# Patient Record
Sex: FEMALE | Race: WHITE | NOT HISPANIC OR LATINO | Employment: UNEMPLOYED | URBAN - METROPOLITAN AREA
[De-identification: names, ages, dates, MRNs, and addresses within clinical notes are randomized per-mention and may not be internally consistent; named-entity substitution may affect disease eponyms.]

---

## 2024-01-31 PROBLEM — J20.9 ACUTE BRONCHITIS: Status: RESOLVED | Noted: 2024-01-31 | Resolved: 2024-01-31

## 2024-01-31 PROBLEM — L30.9 DERMATITIS: Status: RESOLVED | Noted: 2024-01-31 | Resolved: 2024-01-31

## 2024-01-31 PROBLEM — L98.9 SKIN LESION OF FACE: Status: RESOLVED | Noted: 2024-01-31 | Resolved: 2024-01-31

## 2024-01-31 PROBLEM — M75.90 LESION OF SHOULDER: Status: RESOLVED | Noted: 2024-01-31 | Resolved: 2024-01-31

## 2024-01-31 PROBLEM — R10.13 EPIGASTRIC PAIN: Status: RESOLVED | Noted: 2024-01-31 | Resolved: 2024-01-31

## 2024-01-31 PROBLEM — O24.419 GDM (GESTATIONAL DIABETES MELLITUS) (HHS-HCC): Status: RESOLVED | Noted: 2024-01-31 | Resolved: 2024-01-31

## 2024-01-31 RX ORDER — CHOLECALCIFEROL (VITAMIN D3) 25 MCG
TABLET ORAL
COMMUNITY
Start: 2021-06-08

## 2024-02-01 ENCOUNTER — OFFICE VISIT (OUTPATIENT)
Dept: OBSTETRICS AND GYNECOLOGY | Facility: CLINIC | Age: 34
End: 2024-02-01
Payer: COMMERCIAL

## 2024-02-01 VITALS
SYSTOLIC BLOOD PRESSURE: 112 MMHG | WEIGHT: 147 LBS | HEIGHT: 65 IN | DIASTOLIC BLOOD PRESSURE: 68 MMHG | BODY MASS INDEX: 24.49 KG/M2

## 2024-02-01 DIAGNOSIS — Z01.419 WELL WOMAN EXAM: ICD-10-CM

## 2024-02-01 PROCEDURE — 99395 PREV VISIT EST AGE 18-39: CPT | Performed by: OBSTETRICS & GYNECOLOGY

## 2024-02-01 PROCEDURE — 87624 HPV HI-RISK TYP POOLED RSLT: CPT

## 2024-02-01 PROCEDURE — 88175 CYTOPATH C/V AUTO FLUID REDO: CPT

## 2024-02-01 NOTE — PROGRESS NOTES
"Almaz Bates is a 33 y.o. female who is here for a routine exam. PCP = Dalila Magana MD  Patient will do IVF in the next month or 2    Menses : monthly  Contraception : other  HPV vaccine : No  Last pap : 2022 normal  Last HPV : 2020 negative  History of abnormal pap : No  Last mammogram : never  History of abnormal mammogram : No  Colon cancer screen : never    ROS  systems reviewed, anything negative noted in HPI    bladder: no dysuria, gross hematuria, urinary frequency, urgency or incontinence  breast: no lumps, nipple d/c, overlying skin changes, redness, or skin retraction    [unfilled]    Past med hx and past surg hx reviewed and notable for: none    Objective   /68   Ht 1.651 m (5' 5\")   Wt 66.7 kg (147 lb)   LMP 01/10/2024   BMI 24.46 kg/m²      General:   Alert and oriented, in no acute distress   Neck: Supple. No visible thyromegaly.    Breast/Axilla: Normal to palpation bilaterally without masses, skin changes, lymphadenopathy, or nipple discharge.    Abdomen: Soft, non-tender, without masses or organomegaly   Vulva: Normal architecture without erythema, masses, or lesions.    Vagina: Normal mucosa without lesions, masses, or atrophy. No abnormal vaginal discharge.    Cervix: Normal without masses, lesions, or signs of cervicitis.    Uterus: Normal mobile, non-enlarged uterus    Adnexa: Normal without masses or lesions   Pelvic Floor No POP noted.    Psych Normal affect. Normal mood.      Thank you for coming to your annual exam. Your findings during the exam were normal. Specific topics addressed during this exam included: healthy lifestyle, well woman screening guidelines,     Actions performed during this visit include:  - Clinical breast exam  - Clinical pelvic exam  - pap/hpv  No orders of the defined types were placed in this encounter.          Please return for your next visit in 1 year.  "

## 2024-02-22 ENCOUNTER — TELEMEDICINE (OUTPATIENT)
Dept: ENDOCRINOLOGY | Facility: CLINIC | Age: 34
End: 2024-02-22
Payer: COMMERCIAL

## 2024-02-22 DIAGNOSIS — Z13.29 SCREENING FOR THYROID DISORDER: Primary | ICD-10-CM

## 2024-02-22 DIAGNOSIS — Z31.41 FERTILITY TESTING: ICD-10-CM

## 2024-02-22 DIAGNOSIS — Z13.1 SCREENING FOR DIABETES MELLITUS: ICD-10-CM

## 2024-02-22 DIAGNOSIS — Z01.812 ENCOUNTER FOR PREPROCEDURAL LABORATORY EXAMINATION: ICD-10-CM

## 2024-02-22 DIAGNOSIS — Z11.3 SCREENING FOR STDS (SEXUALLY TRANSMITTED DISEASES): ICD-10-CM

## 2024-02-22 DIAGNOSIS — Z01.83 ENCOUNTER FOR RH BLOOD TYPING: ICD-10-CM

## 2024-02-22 PROCEDURE — 1036F TOBACCO NON-USER: CPT | Performed by: NURSE PRACTITIONER

## 2024-02-22 PROCEDURE — 99214 OFFICE O/P EST MOD 30 MIN: CPT | Performed by: NURSE PRACTITIONER

## 2024-02-22 NOTE — PROGRESS NOTES
"  Virtual Visit    Follow Up Visit HPI    Patient is a 33 y.o.  female with Unexplained Infertility presenting today for follow up visit. She is wanting to do an FET.     4 blast remaining    OBHX:     22- , FT, Girl, \"Mahnaz\", gestational diabetes, induced a few days early due to low amniotic fluid.  She is done breastfeeding.    Testing to date:   Result Date Done   Type and Screen: B 2022   Rh: POS 2022   Antibody: No results found for requested labs within last 1825 days.  No results found for requested labs within last 1825 days.   Rubella: POSITIVE (Ref range: ) 2022   Varicella: POSITIVE (Ref range: NEGATIVE) 10/12/2020   TSH: 1.51 (Ref range: 0.44 - 3.98 mIU/L) 10/12/2020   AMH: 2.14 (Ref range: ng/mL) 10/12/2020   PRL: No results found for requested labs within last 365 days. No results found for requested labs within last 365 days.   Testosterone: No results found for requested labs within last 365 days. No results found for requested labs within last 365 days.   DHEAS: No results found for requested labs within last 365 days. No results found for requested labs within last 365 days.   FSH: No results found for requested labs within last 365 days. No results found for requested labs within last 365 days.   17 OHP: No results found for requested labs within last 365 days. No results found for requested labs within last 365 days.   Hepatitis B surface antigen: No results found for requested labs within last 365 days. No results found for requested labs within last 365 days.   Hepatitis C antibody: No results found for requested labs within last 365 days. No results found for requested labs within last 365 days.   HIV ½ Antigen Antibody screen with reflex: No results found for requested labs within last 365 days. No results found for requested labs within last 365 days.   Syphilis screening with reflex: No results found for requested labs within last 365 days. No results found for " requested labs within last 365 days.   Other:     Hysterosalpingogram: Uterus: Normal contour without filling defects  Fallopian Tubes:  Right Fallopian tube: fill and spill, normal architecture, patent  Left Fallopian tube: fill to distal end, normal architecture, unable to confirm intraperitoneal spill, distal blockage vs preferential spill to left   Saline Infused Sonography: n/a  GYN Pelvic Ultrasound: n/a    Partner SA: Normal    Treatment to date: previously did IVF. Has 4 frozen blast.     Past Medical History:   Diagnosis Date    15 weeks gestation of pregnancy 01/04/2022    15 weeks gestation of pregnancy    16 weeks gestation of pregnancy     16 weeks gestation of pregnancy    32 weeks gestation of pregnancy 05/02/2022    32 weeks gestation of pregnancy    Abnormal glucose complicating pregnancy 04/18/2022    Abnormal glucose affecting pregnancy    Acute bronchitis 01/31/2024    Dermatitis 01/31/2024    Encounter for fertility testing 06/08/2021    Fertility testing    Encounter for immunization 11/16/2021    Encounter for administration of vaccine    Encounter for pregnancy test, result positive     Positive blood pregnancy test    Encounter for pregnancy test, result unknown 10/12/2021    Pregnancy examination or test, pregnancy unconfirmed    Encounter for screening for infections with a predominantly sexual mode of transmission 03/14/2021    Routine screening for STI (sexually transmitted infection)    Encounter for supervision of normal first pregnancy, first trimester 12/07/2021    Encounter for prenatal care in first trimester of first pregnancy    Encounter for supervision of normal first pregnancy, second trimester 03/01/2022    Encounter for prenatal care in second trimester of first pregnancy    Encounter for supervision of normal first pregnancy, third trimester 06/13/2022    Encounter for prenatal care in third trimester of first pregnancy    Encounter of female for testing for genetic disease  carrier status for procreative management 2021    Encounter of female for testing for genetic disease carrier status for procreative management    Epigastric pain 2024    Female infertility, unspecified 2021    Female fertility problems    GDM (gestational diabetes mellitus) 2024    Gestational diabetes mellitus in pregnancy, diet controlled 2022    Diet controlled gestational diabetes mellitus (GDM) in third trimester    Lesion of shoulder 2024    Other acute postprocedural pain 2021    Postoperative pain    Skin lesion of face 2024    Supervision of pregnancy resulting from assisted reproductive technology, unspecified trimester 2022    Pregnancy resulting from in-vitro fertilization     No past surgical history on file.  Current Outpatient Medications on File Prior to Visit   Medication Sig Dispense Refill    cholecalciferol (Vitamin D-3) 25 MCG (1000 UT) tablet Take by mouth.      prenatal no115/iron/folic acid (PRENATAL 19 ORAL) Take by mouth.       No current facility-administered medications on file prior to visit.       BMI:   BMI Readings from Last 1 Encounters:   24 24.46 kg/m²     VITALS:  LMP 01/10/2024   LMP: Patient's last menstrual period was 01/10/2024.    ASSESSMENT   33 y.o.  female with wanting to do an FET.     COUNSELING  FET NP Teaching Performed  Reviewed plan for upcoming FET.   Reviewed lining check. Reviewed estrogen priming and progesterone.   Reviewed need for ultrasound monitoring. Reviewed cancelled cycles, sometimes multiple to achieve optimal lining.   Reviewed possibility that embryos might not survive thaw and need for signed thaw plan.   All questions answered.     [ X] Hysteroscopy for cavity evaluation  We have agree to transfer: 1 embryos    Vanessa Rivera CNP          Routine Testing  Fertility Center  STDs Within 1 year   Genetic carrier Waiver/Completed   T&S Within 1 year   AMH Within 1 year   TSH Within 1 year    Rubella/Varicella Within 5 years     BMI Testing  Fertility Center  CBC Within 1 year   CMP Within 1 year   HgbA1c Within 1 year   Mag, Phos, Vit D <18 Within 1 year   MFM > 40  REQ   Wt loss consult > 40 OPT     PLAN  Orders Placed This Encounter   Procedures    Hysteroscopy diagnostic    TSH with reflex to Free T4 if abnormal    Type And Screen    Hepatitis B surface antigen    Hepatitis C Antibody    HIV 1/2 Antigen/Antibody Screen with Reflex to Confirmation    Syphilis Screen with Reflex    C. Trachomatis / N. Gonorrhoeae, Amplified Detection    Hemoglobin A1C    POCT pregnancy, urine manually resulted     Programmed FET:  Estrace 6mg po daily  POI 75mg IM dialy    FOLLOW UP   Consults:  N/A  Engaged MD  Take prenatal vitamins, vitamin D 2000 IUs daily  Discussed that treatment cannot proceed until checklist items are complete.   Additional testing for BMI < 18 or > 40: No.  Chart to primary nurse for care coordination and patient check list/education.      Vanessa Rivera  02/22/2024  1:36 PM

## 2024-02-23 ENCOUNTER — LAB (OUTPATIENT)
Dept: LAB | Facility: LAB | Age: 34
End: 2024-02-23
Payer: COMMERCIAL

## 2024-02-23 DIAGNOSIS — Z13.29 SCREENING FOR THYROID DISORDER: ICD-10-CM

## 2024-02-23 DIAGNOSIS — Z13.1 SCREENING FOR DIABETES MELLITUS: ICD-10-CM

## 2024-02-23 DIAGNOSIS — Z11.3 SCREENING FOR STDS (SEXUALLY TRANSMITTED DISEASES): ICD-10-CM

## 2024-02-23 DIAGNOSIS — Z01.83 ENCOUNTER FOR RH BLOOD TYPING: ICD-10-CM

## 2024-02-23 LAB
ABO GROUP (TYPE) IN BLOOD: NORMAL
ANTIBODY SCREEN: NORMAL
EST. AVERAGE GLUCOSE BLD GHB EST-MCNC: 97 MG/DL
HBA1C MFR BLD: 5 %
HBV SURFACE AG SERPL QL IA: NONREACTIVE
HCV AB SER QL: NONREACTIVE
HIV 1+2 AB+HIV1 P24 AG SERPL QL IA: NONREACTIVE
RH FACTOR (ANTIGEN D): NORMAL
T4 FREE SERPL-MCNC: 1.08 NG/DL (ref 0.78–1.48)
TREPONEMA PALLIDUM IGG+IGM AB [PRESENCE] IN SERUM OR PLASMA BY IMMUNOASSAY: NONREACTIVE
TSH SERPL-ACNC: 4.22 MIU/L (ref 0.44–3.98)

## 2024-02-23 PROCEDURE — 86901 BLOOD TYPING SEROLOGIC RH(D): CPT

## 2024-02-23 PROCEDURE — 84439 ASSAY OF FREE THYROXINE: CPT

## 2024-02-23 PROCEDURE — 86803 HEPATITIS C AB TEST: CPT

## 2024-02-23 PROCEDURE — 86900 BLOOD TYPING SEROLOGIC ABO: CPT

## 2024-02-23 PROCEDURE — 86780 TREPONEMA PALLIDUM: CPT

## 2024-02-23 PROCEDURE — 84443 ASSAY THYROID STIM HORMONE: CPT

## 2024-02-23 PROCEDURE — 83036 HEMOGLOBIN GLYCOSYLATED A1C: CPT

## 2024-02-23 PROCEDURE — 87340 HEPATITIS B SURFACE AG IA: CPT

## 2024-02-23 PROCEDURE — 86850 RBC ANTIBODY SCREEN: CPT

## 2024-02-23 PROCEDURE — 87800 DETECT AGNT MULT DNA DIREC: CPT

## 2024-02-23 PROCEDURE — 87389 HIV-1 AG W/HIV-1&-2 AB AG IA: CPT

## 2024-02-24 LAB
C TRACH RRNA SPEC QL NAA+PROBE: NEGATIVE
N GONORRHOEA DNA SPEC QL PROBE+SIG AMP: NEGATIVE

## 2024-02-26 ENCOUNTER — DOCUMENTATION (OUTPATIENT)
Dept: ENDOCRINOLOGY | Facility: CLINIC | Age: 34
End: 2024-02-26
Payer: COMMERCIAL

## 2024-02-26 DIAGNOSIS — E03.8 OTHER SPECIFIED HYPOTHYROIDISM: Primary | ICD-10-CM

## 2024-02-26 RX ORDER — LEVOTHYROXINE SODIUM 50 UG/1
50 TABLET ORAL DAILY
Qty: 30 TABLET | Refills: 1 | Status: SHIPPED | OUTPATIENT
Start: 2024-02-26 | End: 2024-04-25 | Stop reason: SDUPTHER

## 2024-02-26 NOTE — PROGRESS NOTES
Called patient to discuss. Reviewed diagnosis of hypothyroidism.  Plan to start synthroid 50mcg and recheck in 4 weeks with thyroid antibodies. Orders  placed.

## 2024-03-12 ENCOUNTER — PREP FOR PROCEDURE (OUTPATIENT)
Dept: ENDOCRINOLOGY | Facility: CLINIC | Age: 34
End: 2024-03-12

## 2024-03-12 ENCOUNTER — HOSPITAL ENCOUNTER (OUTPATIENT)
Dept: ENDOCRINOLOGY | Facility: CLINIC | Age: 34
Discharge: HOME | End: 2024-03-12
Payer: COMMERCIAL

## 2024-03-12 VITALS
OXYGEN SATURATION: 100 % | TEMPERATURE: 98.6 F | HEART RATE: 82 BPM | SYSTOLIC BLOOD PRESSURE: 123 MMHG | WEIGHT: 143.74 LBS | BODY MASS INDEX: 27.14 KG/M2 | DIASTOLIC BLOOD PRESSURE: 84 MMHG | RESPIRATION RATE: 18 BRPM | HEIGHT: 61 IN

## 2024-03-12 DIAGNOSIS — Z31.41 FERTILITY TESTING: ICD-10-CM

## 2024-03-12 LAB — PREGNANCY TEST URINE, POC: NEGATIVE

## 2024-03-12 PROCEDURE — 81025 URINE PREGNANCY TEST: CPT | Performed by: STUDENT IN AN ORGANIZED HEALTH CARE EDUCATION/TRAINING PROGRAM

## 2024-03-12 PROCEDURE — 58555 HYSTEROSCOPY DX SEP PROC: CPT | Performed by: OBSTETRICS & GYNECOLOGY

## 2024-03-12 RX ORDER — ACETAMINOPHEN 325 MG/1
650 TABLET ORAL ONCE AS NEEDED
Status: DISCONTINUED | OUTPATIENT
Start: 2024-03-12 | End: 2024-03-13 | Stop reason: HOSPADM

## 2024-03-12 RX ORDER — KETOROLAC TROMETHAMINE 30 MG/ML
30 INJECTION, SOLUTION INTRAMUSCULAR; INTRAVENOUS ONCE AS NEEDED
Status: CANCELLED | OUTPATIENT
Start: 2024-03-12 | End: 2024-03-17

## 2024-03-12 RX ORDER — KETOROLAC TROMETHAMINE 30 MG/ML
30 INJECTION, SOLUTION INTRAMUSCULAR; INTRAVENOUS ONCE AS NEEDED
Status: DISCONTINUED | OUTPATIENT
Start: 2024-03-12 | End: 2024-03-13 | Stop reason: HOSPADM

## 2024-03-12 RX ORDER — ACETAMINOPHEN 325 MG/1
650 TABLET ORAL ONCE AS NEEDED
Status: CANCELLED | OUTPATIENT
Start: 2024-03-12

## 2024-03-12 NOTE — PROGRESS NOTES
Patient ID: Almaz Bates is a 33 y.o. female.    Hysteroscopy diagnostic    Date/Time: 3/12/2024 2:06 PM    Performed by: Bell Ley MD  Authorized by: NEETU Patricio-CNP    Consent:     Consent obtained:  Written    Consent given by:  Patient    Risks, benefits, and alternatives were discussed: yes      Risks discussed:  Bleeding, infection and pain  Universal protocol:     Procedure explained and questions answered to patient or proxy's satisfaction: yes      Relevant documents present and verified: yes      Test results available: yes      Imaging studies available: yes      Required blood products, implants, devices, and special equipment available: yes      Immediately prior to procedure, a time out was called: yes      Patient identity confirmed:  Verbally with patient, arm band and hospital-assigned identification number  Pre-procedure details:     Skin preparation:  Povidone-iodine  Procedure specific details:      Procedure: Diagnostic Hysteroscopy   Preop diagnosis: IVF  Post op diagnosis: Same   Assistant: Carlee Ley    Anesthesia: None   IV: None   EBL: 3 cc  Specimens: None   Complications: None   Risks benefits and alternatives of the procedure explained to the patient and informed consent was obtained. Urine pregnancy test was performed and was negative. Time out was performed. The patient was placed in the dorsal lithotomy position and a sterile speculum was placed in the vagina. The cervix was sterilized with Betadine x3. Paracervical block with lidocaine 1% was administered.   Tenaculum: Yes  Dilation: No  The hysteroscope was placed in the cervix and advanced into the uterine cavity. Normal saline was used for distension media. Images were obtained and findings noted as below.   All instruments were then removed. Good hemostasis was noted. Patient tolerated the procedure well returned to the recovery area in stable condition.   Findings:   Cavity: Smooth cavity no lesions  noted  Ostia: Bilateral tubal ostia visualized  Additional Notes:  Bell Ley MD    Attending Attestation: I was physically present for key and critical portions performed by the fellow. I reviewed the fellow's documentation and discussed the patient with the fellow. I agree with the fellow's medical decision making as documented in the fellow's note.             Post-procedure details:     Procedure completion:  Tolerated well, no immediate complications

## 2024-03-27 ENCOUNTER — LAB (OUTPATIENT)
Dept: LAB | Facility: LAB | Age: 34
End: 2024-03-27
Payer: COMMERCIAL

## 2024-03-27 DIAGNOSIS — E03.8 OTHER SPECIFIED HYPOTHYROIDISM: ICD-10-CM

## 2024-03-27 LAB
THYROPEROXIDASE AB SERPL-ACNC: 42 IU/ML
TSH SERPL-ACNC: 1.5 MIU/L (ref 0.44–3.98)

## 2024-03-27 PROCEDURE — 86376 MICROSOMAL ANTIBODY EACH: CPT

## 2024-03-27 PROCEDURE — 84443 ASSAY THYROID STIM HORMONE: CPT

## 2024-03-27 PROCEDURE — 36415 COLL VENOUS BLD VENIPUNCTURE: CPT

## 2024-03-28 ENCOUNTER — TELEPHONE (OUTPATIENT)
Dept: ENDOCRINOLOGY | Facility: CLINIC | Age: 34
End: 2024-03-28
Payer: COMMERCIAL

## 2024-03-28 DIAGNOSIS — N97.9 FEMALE INFERTILITY: ICD-10-CM

## 2024-03-28 DIAGNOSIS — Z31.83 ENCOUNTER FOR ASSISTED REPRODUCTIVE FERTILITY CYCLE: ICD-10-CM

## 2024-03-28 RX ORDER — LIDOCAINE AND PRILOCAINE 25; 25 MG/G; MG/G
CREAM TOPICAL DAILY
Qty: 30 G | Refills: 2 | Status: SHIPPED | OUTPATIENT
Start: 2024-03-28

## 2024-03-28 RX ORDER — PROPYLENE GLYCOL/PEG 400 0.3 %-0.4%
DROPS OPHTHALMIC (EYE)
Qty: 30 EACH | Refills: 3 | Status: SHIPPED | OUTPATIENT
Start: 2024-03-28 | End: 2024-06-26

## 2024-03-28 RX ORDER — PROGESTERONE 50 MG/ML
75 INJECTION, SOLUTION INTRAMUSCULAR DAILY
Qty: 50 ML | Refills: 3 | Status: SHIPPED | OUTPATIENT
Start: 2024-03-28

## 2024-03-28 RX ORDER — ESTRADIOL 2 MG/1
6 TABLET ORAL DAILY
Qty: 90 TABLET | Refills: 2 | Status: SHIPPED | OUTPATIENT
Start: 2024-03-28 | End: 2024-04-25 | Stop reason: SDUPTHER

## 2024-03-28 NOTE — TELEPHONE ENCOUNTER
RN returned patient's phone call. Patient informed RN her Engaged MD papers all say void. RN sent patient new forms to be completed as soon as possible. Patient expects to start her cycle over the weekend, RN pended orders to be signed off for her FET meds. Patient verbalized understanding of her current plan and will call with the start of her menses.   Ivana Grissom RN 03/28/24 2:13 PM

## 2024-03-28 NOTE — PROGRESS NOTES
Boarding Pass Interval FET Checklist    Age: 33 y.o.    Provider: Vanessa Rivera CNP  Primary RN: Ivana Grissom  Reasons for Treatment: Unexplained Infertility  Last BMI  24 : 27.16 kg/m²       Past Medical History:   Diagnosis Date    15 weeks gestation of pregnancy 2022    15 weeks gestation of pregnancy    16 weeks gestation of pregnancy     16 weeks gestation of pregnancy    32 weeks gestation of pregnancy 2022    32 weeks gestation of pregnancy    Abnormal glucose complicating pregnancy 2022    Abnormal glucose affecting pregnancy    Acute bronchitis 2024    Dermatitis 2024    Encounter for fertility testing 2021    Fertility testing    Encounter for immunization 2021    Encounter for administration of vaccine    Encounter for pregnancy test, result positive     Positive blood pregnancy test    Encounter for pregnancy test, result unknown 10/12/2021    Pregnancy examination or test, pregnancy unconfirmed    Encounter for screening for infections with a predominantly sexual mode of transmission 2021    Routine screening for STI (sexually transmitted infection)    Encounter for supervision of normal first pregnancy, first trimester 2021    Encounter for prenatal care in first trimester of first pregnancy    Encounter for supervision of normal first pregnancy, second trimester 2022    Encounter for prenatal care in second trimester of first pregnancy    Encounter for supervision of normal first pregnancy, third trimester 2022    Encounter for prenatal care in third trimester of first pregnancy    Encounter of female for testing for genetic disease carrier status for procreative management 2021    Encounter of female for testing for genetic disease carrier status for procreative management    Epigastric pain 2024    Female infertility, unspecified 2021    Female fertility problems    GDM (gestational diabetes mellitus)  01/31/2024    Gestational diabetes mellitus in pregnancy, diet controlled 05/19/2022    Diet controlled gestational diabetes mellitus (GDM) in third trimester    Lesion of shoulder 01/31/2024    Other acute postprocedural pain 08/25/2021    Postoperative pain    Skin lesion of face 01/31/2024    Supervision of pregnancy resulting from assisted reproductive technology, unspecified trimester 05/02/2022    Pregnancy resulting from in-vitro fertilization       Date Done Consultation Results/Comments   2/22/2024 Medication Protocol Fertility Plan Update: Programmed FET   Estrace 6mg PO daily, SHANNON 75mg IM    4/2/2024 FET Treatment Plan Packet- ID REQ (Every cycle) Received and in chart: Yes (Ivana Grissom, SHIRA)   3/28/2024 IVF Information and Authorization - [employer]/offsite Storage (ID REQ) (Yearly) Received and in chart: Yes (Ivana Grissom RN)   4/2/2024 [employer] Embryo- Couple or Single Packet (Yearly) Received and in chart: Yes (Ivana Grissom RN)   N/A  Waiver (In) Form Patient's embryos on site    N/A Embryo Ship In Patient's embryos on site   3/28/2024 Procedure Order Placed [x]   N/A MFM Consult Okay to proceed? N/A   N/A Psych Consult Okay to proceed? N/A   N/A Genetics Consult Okay to proceed? N/A   Date Done Female Labs Results/Comments   2/23/2024 T&S (Q 1 Year) Results for orders placed or performed in visit on 02/23/24 (from the past 8760 hour(s))   Type And Screen   Result Value Ref Range    ABO TYPE B     Rh TYPE POS     ANTIBODY SCREEN NEG       2/23/2024 Hep B sAg Nonreactive   2/23/2024 Hep C AB Nonreactive   2/23/2024 HIV Nonreactive   2/23/2024 Syphilis Nonreactive   2/23/2024 GC/CT GC: Negative  CT: Negative   6/13/2022 Rubella (Q 5 Years) POSITIVE   10/12/2020 Varicella (Q 5 Years) POSITIVE   3/27/2024 TSH 1.50 (Ref range: 0.44 - 3.98 mIU/L)   2/23/2024 HgbA1C 5.0 (Ref range: see below %)   3/12/2024 Uterine Cavity Eval HSG: N/A    SIS: N/A    Hyster: (3/12/2024) Procedure: Diagnostic  Hysteroscopy   Preop diagnosis: IVF  Post op diagnosis: Same   Assistant: aCrlee Ley    Anesthesia: None   IV: None   EBL: 3 cc  Specimens: None   Complications: None   Risks benefits and alternatives of the procedure explained to the patient and informed consent was obtained. Urine pregnancy test was performed and was negative. Time out was performed. The patient was placed in the dorsal lithotomy position and a sterile speculum was placed in the vagina. The cervix was sterilized with Betadine x3. Paracervical block with lidocaine 1% was administered.   Tenaculum: Yes  Dilation: No  The hysteroscope was placed in the cervix and advanced into the uterine cavity. Normal saline was used for distension media. Images were obtained and findings noted as below.   All instruments were then removed. Good hemostasis was noted. Patient tolerated the procedure well returned to the recovery area in stable condition.   Findings:   Cavity: Smooth cavity no lesions noted  Ostia: Bilateral tubal ostia visualized  Additional Notes:  Bell Ley MD    Attending Attestation: I was physically present for key and critical portions performed by the fellow. I reviewed the fellow's documentation and discussed the patient with the fellow. I agree with the fellow's medical decision making as documented in the fellow's note.              2/1/2024 Pap Smear NILM, HPV: Negative   N/A Mammogram ( > 40) N/A             Date Done Miscellaneous Results/Comments   N/A BMI Checklist  BMI > 40 or < 18 Added to chart: N/A   N/A >= 45 Checklist  Added to chart: N/A   **Does not need to be completed prior to placing on IVF calendar**  RN confirmed patient's embryos are on site- 3/28/2024 RYAN MELÉNDEZ Completion:  Ectopic Risk: No  Medically Complex: No    Boarding Pass reviewed with: Ivana Grissom RN  Protocol:Programmed FET   Estrace 6mg PO daily, SHANNON 75mg IM   Embryo Selection: 4 available embryos, will transfer 1 embryo with the best grading  FET  Plan complete.  Testing and consents up to date.   Procedure order placed.    Torri Borrero MD PGY 5  Reproductive Endocrinology and Infertility Fellow

## 2024-03-29 ENCOUNTER — SPECIALTY PHARMACY (OUTPATIENT)
Dept: PHARMACY | Facility: CLINIC | Age: 34
End: 2024-03-29

## 2024-03-29 PROCEDURE — RXMED WILLOW AMBULATORY MEDICATION CHARGE

## 2024-04-01 ENCOUNTER — TELEPHONE (OUTPATIENT)
Dept: ENDOCRINOLOGY | Facility: CLINIC | Age: 34
End: 2024-04-01

## 2024-04-01 DIAGNOSIS — N97.9 FEMALE INFERTILITY: ICD-10-CM

## 2024-04-01 NOTE — TELEPHONE ENCOUNTER
RN returned patient's phone call and discussed tentative FET plan. Patient will start estrace today. Patient will call to schedule her lining check for Monday 4/15. Patient to start SHANNON on Friday 4/19 with a FET on Wednesday 4/24 with Dr. Michaud. RN will take patient to the noon meeting tomorrow to present plan. Patient will contact [facility] for progesterone in oil and supplies. Patient verbalized understanding of her plan.   Ivana Grissom RN 04/01/24 12:22 PM

## 2024-04-02 ENCOUNTER — TELEPHONE (OUTPATIENT)
Dept: ENDOCRINOLOGY | Facility: CLINIC | Age: 34
End: 2024-04-02

## 2024-04-02 NOTE — TELEPHONE ENCOUNTER
Rn returned patient's phone call. Patient and spouse are currently completing their Engaged MD forms. Patient informed RN their insurance covers embryo storage and have questions about that with billing through [employer], RN gave patient [employer]'s phone number so patient will receive correct responses to her questions.   Ivana Grissom RN 04/02/24 2:28 PM

## 2024-04-02 NOTE — PROGRESS NOTES
Patient called with menses for FET setup.   LMP: 4/1/2024  Treatment Plan: Programmed FET  Tentative lining check: Monday 4/15/2024  Tentative progesterone start: Friday 4/19/2024  Tentative transfer date: Wednesday 4/24 with Dr. Michaud  Number of days of progesterone for transfer: 6 days  Treatment plan completed on: 4/2/2024  Embryo # confirmed: 4  Embryo # to transfer: 1  Ivana Grissom  04/02/2024  8:05 AM    D/w Dr. Michaud  Reviewed and approved by ROHINI DIETZ on 4/2/24 at 1:26 PM.     RN communicated patient's plan approval through my chart. RN also notified patient that Engaged MD forms need to be completed.   Ivana Grissom RN 04/02/24 1:31 PM

## 2024-04-11 ENCOUNTER — SPECIALTY PHARMACY (OUTPATIENT)
Dept: PHARMACY | Facility: CLINIC | Age: 34
End: 2024-04-11

## 2024-04-11 ENCOUNTER — PHARMACY VISIT (OUTPATIENT)
Dept: PHARMACY | Facility: CLINIC | Age: 34
End: 2024-04-11
Payer: COMMERCIAL

## 2024-04-11 PROCEDURE — RXMED WILLOW AMBULATORY MEDICATION CHARGE

## 2024-04-15 ENCOUNTER — ANCILLARY PROCEDURE (OUTPATIENT)
Dept: ENDOCRINOLOGY | Facility: CLINIC | Age: 34
End: 2024-04-15
Payer: COMMERCIAL

## 2024-04-15 DIAGNOSIS — N97.9 FEMALE INFERTILITY: ICD-10-CM

## 2024-04-15 LAB
ESTRADIOL SERPL-MCNC: 276 PG/ML
PROGEST SERPL-MCNC: 0.3 NG/ML

## 2024-04-15 PROCEDURE — 82670 ASSAY OF TOTAL ESTRADIOL: CPT

## 2024-04-15 PROCEDURE — 84144 ASSAY OF PROGESTERONE: CPT

## 2024-04-15 PROCEDURE — 76857 US EXAM PELVIC LIMITED: CPT

## 2024-04-15 PROCEDURE — 36415 COLL VENOUS BLD VENIPUNCTURE: CPT

## 2024-04-15 NOTE — PROGRESS NOTES
Lining Check    Patient reviewed at team meeting to set up frozen embryo transfer.  Medication protocol: Programmed FET 6mg PO Estrace, SHANNON 75mg   Lining check:  today 4/15  SHANNON Start: Friday 4/19= 75mg ,   RN reviewed SHANNON administration and marked patient's injection sites.   Tentative FET date:  Wednesday 4/24 with Dr. Michaud   Plan to be communicated to patient by treatment team.     Ivana Grissom  04/15/2024  9:32 AM    [facility]  Ultrasound and/or labs reviewed at [facility].   Results for orders placed or performed in visit on 04/15/24 (from the past 96 hour(s))   Estradiol   Result Value Ref Range    Estradiol 276 pg/mL   Progesterone   Result Value Ref Range    Progesterone 0.3 ng/mL     Mon 4/15 (Day 15)   estradiol tablet: Take 6 mg once daily by mouth    Tue 4/16 (Day 16)   estradiol tablet: Take 6 mg once daily by mouth    Wed 4/17 (Day 17)   estradiol tablet: Take 6 mg once daily by mouth    Thu 4/18 (Day 18)   estradiol tablet: Take 6 mg once daily by mouth    Fri 4/19 (Day 19)   progesterone injection: Inject 75 mg once daily into the muscle   estradiol tablet: Take 6 mg once daily by mouth    Sat 4/20 (Day 20)   progesterone injection: Inject 75 mg once daily into the muscle   estradiol tablet: Take 6 mg once daily by mouth    Sun 4/21 (Day 21)   progesterone injection: Inject 75 mg once daily into the muscle   estradiol tablet: Take 6 mg once daily by mouth    Mon 4/22 (Day 22)   progesterone injection: Inject 75 mg once daily into the muscle   estradiol tablet: Take 6 mg once daily by mouth    Tue 4/23 (Day 23)   progesterone injection: Inject 75 mg once daily into the muscle   estradiol tablet: Take 6 mg once daily by mouth    Wed 4/24 (Day 24)   progesterone injection: Inject 75 mg once daily into the muscle   estradiol tablet: Take 6 mg once daily by mouth    RTC in  4/24  for  ET  and Progesterone.   Arabella Michaud  04/15/2024  1:36 PM    RN communicated patient's through my chart  message and phone call. Patient verbalized understanding.   Ivaan Grissom 04/15/24 3:07 PM

## 2024-04-23 ENCOUNTER — PREP FOR PROCEDURE (OUTPATIENT)
Dept: ENDOCRINOLOGY | Facility: CLINIC | Age: 34
End: 2024-04-23
Payer: COMMERCIAL

## 2024-04-23 RX ORDER — ACETAMINOPHEN 325 MG/1
650 TABLET ORAL ONCE AS NEEDED
Status: CANCELLED | OUTPATIENT
Start: 2024-04-23

## 2024-04-24 ENCOUNTER — HOSPITAL ENCOUNTER (OUTPATIENT)
Dept: ENDOCRINOLOGY | Facility: CLINIC | Age: 34
Discharge: HOME | End: 2024-04-24
Payer: COMMERCIAL

## 2024-04-24 DIAGNOSIS — N97.9 FEMALE INFERTILITY: ICD-10-CM

## 2024-04-24 DIAGNOSIS — Z32.00 UNCONFIRMED PREGNANCY: Primary | ICD-10-CM

## 2024-04-24 DIAGNOSIS — Z31.83 ENCOUNTER FOR ASSISTED REPRODUCTIVE FERTILITY CYCLE: ICD-10-CM

## 2024-04-24 LAB — PROGEST SERPL-MCNC: 30.8 NG/ML

## 2024-04-24 PROCEDURE — 89352 THAWING CRYOPRESRVED EMBRYO: CPT | Performed by: OBSTETRICS & GYNECOLOGY

## 2024-04-24 PROCEDURE — 89255 PREPARE EMBRYO FOR TRANSFER: CPT | Performed by: OBSTETRICS & GYNECOLOGY

## 2024-04-24 PROCEDURE — 84144 ASSAY OF PROGESTERONE: CPT

## 2024-04-24 PROCEDURE — 89253 EMBRYO HATCHING: CPT | Performed by: OBSTETRICS & GYNECOLOGY

## 2024-04-24 PROCEDURE — 58974 EMBRYO TRANSFER INTRAUTERINE: CPT | Performed by: OBSTETRICS & GYNECOLOGY

## 2024-04-24 PROCEDURE — 36415 COLL VENOUS BLD VENIPUNCTURE: CPT

## 2024-04-24 RX ORDER — ACETAMINOPHEN 325 MG/1
650 TABLET ORAL ONCE AS NEEDED
Status: DISCONTINUED | OUTPATIENT
Start: 2024-04-24 | End: 2024-04-25 | Stop reason: HOSPADM

## 2024-04-24 NOTE — PROGRESS NOTES
Patient ID: Almaz Bates is a 33 y.o. female.    Embryo Transfer    Date/Time: 4/24/2024 11:50 AM    Performed by: Arabella Michaud MD  Authorized by: NEETU Callaway-CNP    Consent:     Consent obtained:  Written    Consent given by:  Patient    Procedure risks and benefits discussed: yes      Patient questions answered: yes      Patient agrees, verbalizes understanding, and wants to proceed: yes      Educational handouts given: yes      Instructions and paperwork completed: yes    Procedure:     Pelvic exam performed: No      Cervix cleaned and prepped: Yes      Speculum placed in vagina: Yes      Ultrasound guidance: Yes      Catheter type:  Sure View Bailey    Difficulty: easy      Estimated blood loss:  None  Post-procedure:     Patient tolerated procedure well: yes    Comments:      Preop diagnosis: Infertility  Post op diagnosis: Same  Assistant: None  Depth: 7 cm  Curve: 15  Distance from fundus: 12 mm  Embryo stage at day of transfer: day 6  Embryo notes: 4AA   Additional Notes:  Anesthesia: None    IV Fluids: None  UOP: Not recorded  Specimens: None  Complications: None  This replaces an operative report.    Attending Attestation: I was physically present for key and critical portions performed by the fellow. I reviewed the fellow's documentation and discussed the patient with the fellow. I agree with the fellow's medical decision making as documented in the fellow's note.   Arabella Michaud 04/24/24 11:57 AM

## 2024-04-24 NOTE — DISCHARGE INSTRUCTIONS
[facility]  [address]. [place]  [address]  Tel: [phone]   Fax: [phone]    Home Going Instructions after Embryo Transfer:     After completing your embryo transfer please treat your body as though you are pregnant.  No smoking, alcohol, or recreational drugs.  Make sure you are taking a prenatal vitamin daily.   Continue all medications currently prescribed for embryo transfer until otherwise instructed by your physician, even if you experience spotting or bleeding and even if you have a negative home pregnancy test.   Medications to avoid in pregnancy: Advil, Motrin, Ibuprofen, Aleve, Excedrin, Dneae-Pamplico, Sudafed, and Pepto-Bismol. Avoid aspirin unless you are taking this daily at the direction of your physician.   Medications that are generally safe in pregnancy: Tylenol, Benadryl, Plain Robitussin, Zyrtec, Claritin, Pepcid, Tums, Rolaids, Mylanta, Nasonex.   Foods to avoid:   Seafood that is high in mercury; you can have canned tuna twice a week.   Deli meats, deli salads, hot dogs, and unpasteurized cheeses due to the risk of Listeria.  Activities to avoid:   No hot tubs, whirlpools, or saunas.  Avoid starting new exercise routines that you have not done previously.  Avoid lifting > 30 lbs.  No cleaning litter boxes.  No intercourse until you test for pregnancy.   You do not need to be on bed rest following the transfer. It is healthy, normal, and recommended to go about routine physical activity.   We advise against taking a home pregnancy test due to the possibility of false negative and false positive results.   You will test for pregnancy in approximately 10 days, at which point you will be about 4 weeks pregnant.   If blood work was drawn on the day of your transfer, you can expect a phone that day with the results of your bloodwork. We expect a progesterone level greater than or equal to 16. If you level is less than 16  we will adjust your progesterone dose and repeat your level in 2 days.     Viky Randhawa    11:22 AM

## 2024-04-25 ENCOUNTER — TELEPHONE (OUTPATIENT)
Dept: ENDOCRINOLOGY | Facility: CLINIC | Age: 34
End: 2024-04-25
Payer: COMMERCIAL

## 2024-04-25 DIAGNOSIS — E03.8 OTHER SPECIFIED HYPOTHYROIDISM: ICD-10-CM

## 2024-04-25 DIAGNOSIS — N97.9 FEMALE INFERTILITY: ICD-10-CM

## 2024-04-25 RX ORDER — ESTRADIOL 2 MG/1
6 TABLET ORAL DAILY
Qty: 90 TABLET | Refills: 3 | Status: SHIPPED | OUTPATIENT
Start: 2024-04-25

## 2024-04-25 RX ORDER — LEVOTHYROXINE SODIUM 50 UG/1
50 TABLET ORAL DAILY
Qty: 30 TABLET | Refills: 3 | Status: SHIPPED | OUTPATIENT
Start: 2024-04-25 | End: 2024-04-25 | Stop reason: SDUPTHER

## 2024-04-25 RX ORDER — LEVOTHYROXINE SODIUM 50 UG/1
50 TABLET ORAL DAILY
Qty: 30 TABLET | Refills: 3 | Status: SHIPPED | OUTPATIENT
Start: 2024-04-25 | End: 2025-04-25

## 2024-04-25 NOTE — TELEPHONE ENCOUNTER
Reason for call: Medication request  Medication requested: Levothyroxine  Notes: Pt would like script sent to [facility] in [place] ([id number] [address])

## 2024-04-25 NOTE — PROGRESS NOTES
Requested Prescriptions     Signed Prescriptions Disp Refills    estradiol (Estrace) 2 mg tablet 90 tablet 3     Sig: Take 3 tablets (6 mg) by mouth once daily.    levothyroxine (Synthroid, Levoxyl) 50 mcg tablet 30 tablet 3     Sig: Take 1 tablet (50 mcg) by mouth once daily.     Sent 30 day supply of above medications to preferred retail pharmacy per Viky Randhawa RN per patient request.       Suzanna Bautista (Katie), PharmROBINSON  [facility]  Clinical Pharmacy Specialist- Fertility   [facility], Farida Coreas  [address] [place] [address]  Email: [email or web address]  Tel: [phone]       Fax: [phone]

## 2024-04-25 NOTE — TELEPHONE ENCOUNTER
Returned patient call regarding Levothyroxine Rx. Patient instructed refill was sent to local [facility] pharmacy and will be available later today for . Patient agreeable. Patient denies further questions or concerns at this time.   JOSE MUNOZ on 4/25/24 at 9:33 AM.

## 2024-05-06 ENCOUNTER — LAB (OUTPATIENT)
Dept: LAB | Facility: LAB | Age: 34
End: 2024-05-06
Payer: COMMERCIAL

## 2024-05-06 DIAGNOSIS — N97.9 FEMALE INFERTILITY: ICD-10-CM

## 2024-05-06 DIAGNOSIS — Z32.00 UNCONFIRMED PREGNANCY: ICD-10-CM

## 2024-05-06 LAB
B-HCG SERPL-ACNC: <3 MIU/ML
ESTRADIOL SERPL-MCNC: 156 PG/ML
PROGEST SERPL-MCNC: 17.6 NG/ML

## 2024-05-06 PROCEDURE — 36415 COLL VENOUS BLD VENIPUNCTURE: CPT

## 2024-05-06 PROCEDURE — 84144 ASSAY OF PROGESTERONE: CPT

## 2024-05-06 PROCEDURE — 82670 ASSAY OF TOTAL ESTRADIOL: CPT

## 2024-05-06 PROCEDURE — 84702 CHORIONIC GONADOTROPIN TEST: CPT

## 2024-05-06 NOTE — PROGRESS NOTES
Patient presents to outside  lab for HCG s/p FET on 4/24/2024. Will call patient this afternoon with results and plan.  Fanny NAVARRO  05/06/24   7:48 AM

## 2024-05-07 ENCOUNTER — TELEPHONE (OUTPATIENT)
Dept: ENDOCRINOLOGY | Facility: CLINIC | Age: 34
End: 2024-05-07
Payer: COMMERCIAL

## 2024-05-07 NOTE — TELEPHONE ENCOUNTER
Telephone call returned to patient. Reviewed negative HCG result with patient and instructed patient to stop medications. Informed patient that a staff message was sent to Dr. Michaud to make her aware. Patient understandable. Patient reports light spotting having started today. Patient states wishes to go into another embryo transfer. Plan to bring resulted HCG to noon meeting and call patient later this afternoon with next steps.    05/07/24 at 8:44 AM - Mily Mayer RN

## 2024-05-07 NOTE — TELEPHONE ENCOUNTER
Reason for call: Patient calling to talk to a nurse about her blood work HCG results from yesterday  Notes:

## 2024-05-07 NOTE — PROGRESS NOTES
Patient presents with negative HCG s/p FET on 4/24/24. Telephone call returned to patient this morning instructing patient to stop all medications. Patient states she has started spotting today and that her wishes are to go right into another embryo transfer. Dr. Michaud made aware via staff message.    Component      Latest Ref Rng 5/6/2024   HCG, Beta-Quantitative      <5 mIU/mL <3      05/07/24 at 8:46 AM - Mily Mayer RN    [facility] PROVIDER NOTE - HCG REVIEW  Ultrasound and/or labs reviewed at [facility].     Results for orders placed or performed in visit on 05/06/24 (from the past 96 hour(s))   Estradiol   Result Value Ref Range    Estradiol 156 pg/mL   Progesterone   Result Value Ref Range    Progesterone 17.6 ng/mL   Human Chorionic Gonadotropin, Serum Quantitative   Result Value Ref Range    HCG, Beta-Quantitative <3 <5 mIU/mL     Reviewed negative hCG, patient to discontinue medications and await call from Dr. Michaud this week.  D/w Dr. Leland Ley  05/07/2024  1:07 PM    Telephone call made to patient regarding MD plan above. Patient agreeable. Patient questions if she may resume light physical activity, as she is used to being active. This RN told her that is appropriate to resume. Patient states wishes to go into another embryo transfer. Plan for patient to contact office with start of next period or await call from Dr. Michaud - Dr. Michaud made aware earlier this morning by this RN of these wishes. All questions answered.    05/07/24 at 1:14 PM - Mily Mayer RN

## 2024-05-10 ENCOUNTER — TELEPHONE (OUTPATIENT)
Dept: ENDOCRINOLOGY | Facility: CLINIC | Age: 34
End: 2024-05-10
Payer: COMMERCIAL

## 2024-05-10 NOTE — TELEPHONE ENCOUNTER
Telephone call returned to patient. Patient states she has started spotting and anticipates full flow bleed, CD 1, tomorrow 5/11. Per communication with Dr. Michaud, plan for patient to go right into another embryo transfer. Patient states having Estrace and SHANNON left over from previous cycle. This RN instructed patient to start Estrace 6mg on CD 1. This RN instructed patient to call office first thing Monday morning so that we can get her set up for her next FET. Patient agreeable. Plan for communication with patient on Monday. All questions answered.    05/10/24 at 3:52 PM - Mily Mayer RN

## 2024-05-10 NOTE — TELEPHONE ENCOUNTER
Reason for call: Call Back  Notes: Pt wants to verify her next steps should her cycle start over the weekend.

## 2024-05-13 ENCOUNTER — TELEPHONE (OUTPATIENT)
Dept: ENDOCRINOLOGY | Facility: CLINIC | Age: 34
End: 2024-05-13
Payer: COMMERCIAL

## 2024-05-13 DIAGNOSIS — Z31.83 ENCOUNTER FOR ASSISTED REPRODUCTIVE FERTILITY CYCLE: ICD-10-CM

## 2024-05-13 DIAGNOSIS — N97.9 FEMALE INFERTILITY: ICD-10-CM

## 2024-05-13 RX ORDER — ESTRADIOL 2 MG/1
6 TABLET ORAL DAILY
Qty: 90 TABLET | Refills: 2 | Status: SHIPPED | OUTPATIENT
Start: 2024-05-13 | End: 2025-05-13

## 2024-05-13 RX ORDER — PROGESTERONE 50 MG/ML
75 INJECTION, SOLUTION INTRAMUSCULAR DAILY
Qty: 50 ML | Refills: 3 | Status: SHIPPED | OUTPATIENT
Start: 2024-05-13 | End: 2025-05-13

## 2024-05-13 NOTE — PROGRESS NOTES
Patient called with menses for FET setup.   LMP: 5/11/24   Plan: Programmed FET ; Estrace 6mg PO daily, SHANNON 75mg IM   Tentative lining check: 5/22 (CD 12)   Tentative progesterone start: 5/29 (CD 19)   Tentative transfer date: 6/3 with Dr. Ha (CD 24)   Number of days of progesterone for transfer: 6   Treatment plan: sent to patient 5/13 0900   Embryo # confirmed: 3 embryos on site   Embryo # to transfer: 1      05/13/24 at 9:40 AM - Mily Mayer RN    Reviewed and approved by SOO ESCUDERO on 5/13/24 at 1:26 PM.    Telephone call made to patient to discuss MD plan above. Patient agreeable to plan. This RN discussed with patient that she and her partner need to sign FET treatment plan/Out waiver sent to email today. Patient states they are to sign this asap. Plan for this RN to send MyChart to patient later with Dr. Michaud thoughts on patient/partner going out of town 6/30-7/7 - if patient successfully gets pregnant from this ET, she would be 6.5-7wks at the time she is OOT. Otherwise all questions answered. Patient transferred to  to schedule for lining check/lab work appt on 5/22.    05/13/24 at 2:32 PM - Mily Mayer RN

## 2024-05-13 NOTE — TELEPHONE ENCOUNTER
Telephone call returned to patient. Patient confirms LMP 5/11/24. Patient confirms starting Estrace 6mg on 5/11 per plan. Patient states she will be out of town from 6/30-7/7 later this year and questions if that will hinder a potential successful transfer/ultrasound schedule. Plan for RN to mention this in FET setup during meeting. See FET setup meeting note from today for tentative dates. All questions answered. Plan for this RN to contact patient later this afternoon with plan.     05/13/24 at 9:30 AM - Mily Mayer RN

## 2024-05-14 ENCOUNTER — TELEPHONE (OUTPATIENT)
Dept: ENDOCRINOLOGY | Facility: CLINIC | Age: 34
End: 2024-05-14
Payer: COMMERCIAL

## 2024-05-14 NOTE — TELEPHONE ENCOUNTER
Reason for call: Patient is trying to sign her engaged MD documents and it is not working. Please call her.  Notes:

## 2024-05-14 NOTE — TELEPHONE ENCOUNTER
Telephone call returned to patient. Patient states she is unable to sign the FET treatment form, when she goes to fill it out it will not let her and it remains blank. This RN voided that combined envelope with FET treatment form/out waiver and re-sent the two forms as separate documents. Plan for patient/partner to work on the newly sent forms now. Plan for patient to let us know when they complete the forms. All questions answered.    05/14/24 at 10:22 AM - Mily Mayer RN

## 2024-05-16 PROCEDURE — RXMED WILLOW AMBULATORY MEDICATION CHARGE

## 2024-05-20 ENCOUNTER — TELEPHONE (OUTPATIENT)
Dept: ENDOCRINOLOGY | Facility: CLINIC | Age: 34
End: 2024-05-20
Payer: COMMERCIAL

## 2024-05-20 NOTE — TELEPHONE ENCOUNTER
Reason for call:   Notes: Patient was wanting to make sure her PA for her transfer has been started

## 2024-05-20 NOTE — TELEPHONE ENCOUNTER
Placed call to patient. Relayed the Isabella LOUIS who coordinates prior auths did see that patient already has an approval for a transfer. Patient informed this RN that that approval would coincide with the transfer that she paid out of pocket for as the PA was not finalized at time of transfer -- she is currently working on getting reimbursed through her insurance for this. After patient discussed with her insurance company, she would need a new PA in this scenario. Message sent to Isabella LOUIS. Explaining situation to ensure that PA is submitted and finalized prior to transfer.   LIOR HARVEY, SHIRA 05/20/2024 15:13

## 2024-05-22 ENCOUNTER — ANCILLARY PROCEDURE (OUTPATIENT)
Dept: ENDOCRINOLOGY | Facility: CLINIC | Age: 34
End: 2024-05-22
Payer: COMMERCIAL

## 2024-05-22 ENCOUNTER — TELEPHONE (OUTPATIENT)
Dept: ENDOCRINOLOGY | Facility: CLINIC | Age: 34
End: 2024-05-22

## 2024-05-22 DIAGNOSIS — N97.9 FEMALE INFERTILITY: ICD-10-CM

## 2024-05-22 LAB
ESTRADIOL SERPL-MCNC: 445 PG/ML
LH SERPL-ACNC: 19 IU/L

## 2024-05-22 PROCEDURE — 36415 COLL VENOUS BLD VENIPUNCTURE: CPT

## 2024-05-22 PROCEDURE — 76857 US EXAM PELVIC LIMITED: CPT

## 2024-05-22 PROCEDURE — 82670 ASSAY OF TOTAL ESTRADIOL: CPT

## 2024-05-22 PROCEDURE — 83002 ASSAY OF GONADOTROPIN (LH): CPT

## 2024-05-22 RX ORDER — ESTRADIOL 2 MG/1
2 TABLET ORAL 2 TIMES DAILY
Start: 2024-05-22 | End: 2025-05-22

## 2024-05-22 NOTE — TELEPHONE ENCOUNTER
Placed call to patient. Confirmed that oral estrace that she has been taking will also be used vaginally -- new script is not needed in this circumstance. Reviewed insertion of tablet instructions for administration. Patient verbalized understanding of plan, all questions addressed at this time.     LIOR HARVEY RN 05/22/2024 15:18

## 2024-05-22 NOTE — TELEPHONE ENCOUNTER
Reason for call: Medication  Notes: Pt has been taking oral Estradiol and was told to start using Estradiol suppositories as well beginning tonight. She stated the script for the suppositories has not yet been sent in. Pt would like it sent to [facility] [address] in [place].

## 2024-05-22 NOTE — PROGRESS NOTES
CYCLING NOTE    Here for US and/or lab monitoring for Lining Check for FET #3; relevant findings reviewed.  Programmed FET:Tentative progesterone start: 5/29 (CD 19)   Tentative transfer date: 6/3 with Dr. Ha (CD 24)   Patient stayed for nurse visit. Pain is 0/10. Reviewed SHANNON injections, bilateral gluteal sites marked.   Team will contact patient later today with results and plan.    Cristobal Nuñez  05/22/2024  9:31 AM    HUDDLE PROVIDER NOTE  Ultrasound and/or labs reviewed at [facility].   Results for orders placed or performed in visit on 05/22/24 (from the past 96 hour(s))   Estradiol   Result Value Ref Range    Estradiol 445 pg/mL   Luteinizing Hormone (LH)   Result Value Ref Range    Luteinizing Hormone 19.0 IU/L     Wed 5/22 (Day 12)   estradiol 2 mg tablet: Take 6 mg once daily by mouth   estradiol 2 mg tablet: Insert 2 mg 2 times a day into the vagina    Thu 5/23 (Day 13)   estradiol 2 mg tablet: Take 6 mg once daily by mouth   estradiol 2 mg tablet: Insert 2 mg 2 times a day into the vagina    Fri 5/24 (Day 14)   estradiol 2 mg tablet: Take 6 mg once daily by mouth   estradiol 2 mg tablet: Insert 2 mg 2 times a day into the vagina    Sat 5/25 (Day 15)   estradiol 2 mg tablet: Take 6 mg once daily by mouth   estradiol 2 mg tablet: Insert 2 mg 2 times a day into the vagina    RTC in  SAT  for Lining check and Estradiol, Progesterone, and LH.   Arabella Michaud  05/22/2024  1:27 PM    My Chart message sent to patient with above noted plan.  Cristobal Nuñez 05/22/24 1:48 PM

## 2024-05-25 ENCOUNTER — LAB (OUTPATIENT)
Dept: LAB | Facility: LAB | Age: 34
End: 2024-05-25
Payer: COMMERCIAL

## 2024-05-25 ENCOUNTER — ANCILLARY PROCEDURE (OUTPATIENT)
Dept: ENDOCRINOLOGY | Facility: CLINIC | Age: 34
End: 2024-05-25
Payer: COMMERCIAL

## 2024-05-25 DIAGNOSIS — N97.9 FEMALE INFERTILITY: ICD-10-CM

## 2024-05-25 LAB
ESTRADIOL SERPL-MCNC: 2083 PG/ML
LH SERPL-ACNC: 10.1 IU/L
PROGEST SERPL-MCNC: 0.2 NG/ML

## 2024-05-25 PROCEDURE — 83002 ASSAY OF GONADOTROPIN (LH): CPT

## 2024-05-25 PROCEDURE — 82670 ASSAY OF TOTAL ESTRADIOL: CPT

## 2024-05-25 PROCEDURE — 36415 COLL VENOUS BLD VENIPUNCTURE: CPT

## 2024-05-25 PROCEDURE — 76857 US EXAM PELVIC LIMITED: CPT

## 2024-05-25 PROCEDURE — 84144 ASSAY OF PROGESTERONE: CPT

## 2024-05-25 NOTE — PROGRESS NOTES
CYCLING NOTE - Lining Check    Here for US and/or lab monitoring; relevant findings reviewed.    Patient presents to clinic for r/p lining check (vaginal Estrace added on 5/22) and lab work for FET #3. Tentative SHANNON start 5/29  Tentative transfer date 6/3 with Dr. Ha    Patient stayed for nurse visit. Pain is 0/10. Patient confirms doing oral + vaginal Estrace as ordered.  Team will contact patient later today with results and plan.    Mily MarroquinBanners  05/25/2024  8:05 AM    [facility] PROVIDER NOTE  Ultrasound and/or labs reviewed at [facility].   Results for orders placed or performed in visit on 05/22/24 (from the past 96 hour(s))   Estradiol   Result Value Ref Range    Estradiol 445 pg/mL   Luteinizing Hormone (LH)   Result Value Ref Range    Luteinizing Hormone 19.0 IU/L     Sat 5/25 (Day 15)   estradiol 2 mg tablet: Take 6 mg once daily by mouth   estradiol 2 mg tablet: Insert 2 mg 2 times a day into the vagina    Sun 5/26 (Day 16)   estradiol 2 mg tablet: Take 6 mg once daily by mouth   estradiol 2 mg tablet: Insert 2 mg 2 times a day into the vagina    Mon 5/27 (Day 17)   estradiol 2 mg tablet: Take 6 mg once daily by mouth   estradiol 2 mg tablet: Insert 2 mg 2 times a day into the vagina    Tue 5/28 (Day 18)   estradiol 2 mg tablet: Take 6 mg once daily by mouth   estradiol 2 mg tablet: Insert 2 mg 2 times a day into the vagina    Wed 5/29 (Day 19)   progesterone injection: Inject 75 mg once daily into the muscle   estradiol 2 mg tablet: Take 6 mg once daily by mouth    Thu 5/30 (Day 20)   progesterone injection: Inject 75 mg once daily into the muscle   estradiol 2 mg tablet: Take 6 mg once daily by mouth    Fri 5/31 (Day 21)   progesterone injection: Inject 75 mg once daily into the muscle   estradiol 2 mg tablet: Take 6 mg once daily by mouth    Sat 6/1 (Day 22)   progesterone injection: Inject 75 mg once daily into the muscle   estradiol 2 mg tablet: Take 6 mg once daily by mouth    Sun 6/2 (Day  23)   progesterone injection: Inject 75 mg once daily into the muscle   estradiol 2 mg tablet: Take 6 mg once daily by mouth    Mon 6/3 (Day 24)   progesterone injection: Inject 75 mg once daily into the muscle   estradiol 2 mg tablet: Take 6 mg once daily by mouth    RTC in  6/3  for EMBRYO TRANSFER and Progesterone.   Valerie Ha  05/25/2024  9:46 AM    Telephone call made to patient with MD plan above. Quosishart sent to patient with plan. All questions answered. Patient previously got SHANNON sites marked/SHANNON instructions.    05/25/24 at 11:17 AM - Mily Mayer RN

## 2024-05-31 ENCOUNTER — TELEPHONE (OUTPATIENT)
Dept: ENDOCRINOLOGY | Facility: CLINIC | Age: 34
End: 2024-05-31
Payer: COMMERCIAL

## 2024-05-31 NOTE — TELEPHONE ENCOUNTER
Returned patient call regarding upcoming ET on 6/3/24. Patient wants to ensure [employer] approval for transfer has gone through and no surprise charges will happen day of ET. High priority message sent to financial pool and leadership team to verify. Patient instructed she will hear from the lab the day before her transfer to schedule a time. Patient instructed either this RN, leadership or financial team will reach out to patient regarding approval. Patient agreeable. Patient denies further questions/concerns.   JOSE MUNOZ on 5/31/24 at 2:42 PM.

## 2024-05-31 NOTE — TELEPHONE ENCOUNTER
Reason for call: Patient is calling to talk to a nurse has questions about her transfer on Monday   Notes:

## 2024-06-03 ENCOUNTER — PHARMACY VISIT (OUTPATIENT)
Dept: PHARMACY | Facility: CLINIC | Age: 34
End: 2024-06-03
Payer: COMMERCIAL

## 2024-06-03 ENCOUNTER — HOSPITAL ENCOUNTER (OUTPATIENT)
Dept: ENDOCRINOLOGY | Facility: CLINIC | Age: 34
Discharge: HOME | End: 2024-06-03
Payer: COMMERCIAL

## 2024-06-03 ENCOUNTER — PREP FOR PROCEDURE (OUTPATIENT)
Dept: ENDOCRINOLOGY | Facility: CLINIC | Age: 34
End: 2024-06-03

## 2024-06-03 ENCOUNTER — TELEPHONE (OUTPATIENT)
Dept: ENDOCRINOLOGY | Facility: CLINIC | Age: 34
End: 2024-06-03
Payer: COMMERCIAL

## 2024-06-03 ENCOUNTER — SPECIALTY PHARMACY (OUTPATIENT)
Dept: PHARMACY | Facility: CLINIC | Age: 34
End: 2024-06-03

## 2024-06-03 VITALS — HEIGHT: 61 IN | WEIGHT: 148.15 LBS | BODY MASS INDEX: 27.97 KG/M2

## 2024-06-03 DIAGNOSIS — Z31.83 ENCOUNTER FOR ASSISTED REPRODUCTIVE FERTILITY CYCLE: ICD-10-CM

## 2024-06-03 DIAGNOSIS — Z32.00 UNCONFIRMED PREGNANCY: Primary | ICD-10-CM

## 2024-06-03 DIAGNOSIS — N97.9 FEMALE INFERTILITY: ICD-10-CM

## 2024-06-03 LAB — PROGEST SERPL-MCNC: 48.3 NG/ML

## 2024-06-03 PROCEDURE — 76998 US GUIDE INTRAOP: CPT | Performed by: OBSTETRICS & GYNECOLOGY

## 2024-06-03 PROCEDURE — 36415 COLL VENOUS BLD VENIPUNCTURE: CPT

## 2024-06-03 PROCEDURE — 84144 ASSAY OF PROGESTERONE: CPT

## 2024-06-03 RX ORDER — ACETAMINOPHEN 325 MG/1
650 TABLET ORAL ONCE AS NEEDED
Status: CANCELLED | OUTPATIENT
Start: 2024-06-03

## 2024-06-03 RX ORDER — ACETAMINOPHEN 325 MG/1
650 TABLET ORAL ONCE AS NEEDED
Status: DISCONTINUED | OUTPATIENT
Start: 2024-06-03 | End: 2024-06-04 | Stop reason: HOSPADM

## 2024-06-03 NOTE — DISCHARGE INSTRUCTIONS
[facility]  [address]. [place]  [address]  Tel: [phone]   Fax: [phone]    Home Going Instructions after Embryo Transfer:     After completing your embryo transfer please treat your body as though you are pregnant.  No smoking, alcohol, or recreational drugs.  Make sure you are taking a prenatal vitamin daily.   Continue all medications currently prescribed for embryo transfer until otherwise instructed by your physician, even if you experience spotting or bleeding and even if you have a negative home pregnancy test.   Medications to avoid in pregnancy: Advil, Motrin, Ibuprofen, Aleve, Excedrin, Denae-Metaline, Sudafed, and Pepto-Bismol. Avoid aspirin unless you are taking this daily at the direction of your physician.   Medications that are generally safe in pregnancy: Tylenol, Benadryl, Plain Robitussin, Zyrtec, Claritin, Pepcid, Tums, Rolaids, Mylanta, Nasonex.   Foods to avoid:   Seafood that is high in mercury; you can have canned tuna twice a week.   Deli meats, deli salads, hot dogs, and unpasteurized cheeses due to the risk of Listeria.  Activities to avoid:   No hot tubs, whirlpools, or saunas.  Avoid starting new exercise routines that you have not done previously.  Avoid lifting > 30 lbs.  No cleaning litter boxes.  No intercourse until you test for pregnancy.   You do not need to be on bed rest following the transfer. It is healthy, normal, and recommended to go about routine physical activity.   We advise against taking a home pregnancy test due to the possibility of false negative and false positive results.   You will test for pregnancy in approximately 10 days, at which point you will be about 4 weeks pregnant.   If blood work was drawn on the day of your transfer, you can expect a phone that day with the results of your bloodwork. We expect a progesterone level greater than or equal to 16. If you level is less than 16  we will adjust your progesterone dose and repeat your level in 2 days.     WILLIAN PHAM    10:40 AM

## 2024-06-03 NOTE — PROGRESS NOTES
Patient ID: Almaz Bates is a 34 y.o. female.    Embryo Transfer    Date/Time: 6/3/2024 11:09 AM    Performed by: Valerie Ha MD  Authorized by: NEETU Patricio-CNP    Consent:     Consent obtained:  Written    Consent given by:  Patient    Procedure risks and benefits discussed: yes      Patient questions answered: yes      Patient agrees, verbalizes understanding, and wants to proceed: yes      Educational handouts given: yes      Instructions and paperwork completed: yes    Procedure:     Pelvic exam performed: No      Cervix cleaned and prepped: Yes      Speculum placed in vagina: Yes      Ultrasound guidance: Yes      Catheter type:  Sure View Bailey    Difficulty: easy      Estimated blood loss:  None  Post-procedure:     Patient tolerated procedure well: yes    Comments:      Preop diagnosis: Infertility  Post op diagnosis: Same  Assistant: MD Adair  Depth: 7 cm  Curve: 15 degree anteverted  Distance from fundus: 12 mm  Embryo stage at day of transfer: day 6  Embryo notes: 4BA   Additional Notes:  Anesthesia: None    IV Fluids: None  UOP: Not recorded  Specimens: None  Complications: None  This replaces an operative report.    Attending Attestation: I was physically present for key and critical portions performed by the fellow. I reviewed the fellow's documentation and discussed the patient with the fellow. I agree with the fellow's medical decision making as documented in the fellow's note.   Valerie Ha 06/03/24 11:10 AM

## 2024-06-03 NOTE — TELEPHONE ENCOUNTER
----- Message from Michelle Navdeep sent at 6/3/2024  6:46 AM EDT -----  Yes, I see an approved authorization for this service. Thanks  ----- Message -----  From: Jose Mendoza RN  Sent: 5/31/2024   2:38 PM EDT  To: Kathy Rucker RN; Fanny Navarrete RN; [facility]    I just spoke to this patient. She has a ET on 6/3/24 and wants to confirm that she is approved and all set to go. She said she spoke with [employer] and they stated she is approved. She said they have been having issues over the last few weeks and wants to ensure no surprises day of transfer.   Thank you,   JOSE MENDOZA on 5/31/24 at 2:38 PM.

## 2024-06-13 ENCOUNTER — ANCILLARY PROCEDURE (OUTPATIENT)
Dept: ENDOCRINOLOGY | Facility: CLINIC | Age: 34
End: 2024-06-13
Payer: COMMERCIAL

## 2024-06-13 DIAGNOSIS — Z13.29 SCREENING FOR THYROID DISORDER: ICD-10-CM

## 2024-06-13 DIAGNOSIS — Z32.01 POSITIVE BLOOD PREGNANCY TEST (HHS-HCC): Primary | ICD-10-CM

## 2024-06-13 DIAGNOSIS — Z32.00 UNCONFIRMED PREGNANCY: ICD-10-CM

## 2024-06-13 LAB — B-HCG SERPL-ACNC: 402 MIU/ML

## 2024-06-13 PROCEDURE — 36415 COLL VENOUS BLD VENIPUNCTURE: CPT

## 2024-06-13 NOTE — PROGRESS NOTES
Patient presents for initial HCG s/p FET on 6/3/24.     Initial HC  Patient's CRYSTAL Provider: Arabella Michaud MD  Infertility dx: Unexplained Infertility  Achieved pregnancy by: Embryo transfer  Fertility medications used this cycle:   LMP: Patient's last menstrual period was 24  EGA based on (IUI date, ET ): embryo transfer date = 4 1/7 weeks    Updated G/P with this pregnancy:   OB HX:  OB History    Para Term  AB Living   2 1           SAB IAB Ectopic Multiple Live Births                  # Outcome Date GA Lbr Clark/2nd Weight Sex Delivery Anes PTL Lv   2 Current            1 Para                Type & Screen: B POS ANTIBODY NEG  History of miscarriage: No  History of pelvic/abdominal surgeries: No  History of STDs/PID: No  Hx of ectopic: No  Hx of tubal disease/ blockage: No    Risk for ectopic: No      Current medications:     Current Outpatient Medications:     cholecalciferol (Vitamin D-3) 25 MCG (1000 UT) tablet, Take by mouth., Disp: , Rfl:     estradiol (Estrace) 2 mg tablet, Take 3 tablets (6 mg) by mouth once daily., Disp: 90 tablet, Rfl: 3    estradiol (Estrace) 2 mg tablet, Take 3 tablets (6 mg) by mouth once daily., Disp: 90 tablet, Rfl: 2    estradiol (Estrace) 2 mg tablet, Insert 1 tablet (2 mg) into the vagina 2 times a day., Disp: , Rfl:     levothyroxine (Synthroid, Levoxyl) 50 mcg tablet, Take 1 tablet (50 mcg) by mouth once daily., Disp: 30 tablet, Rfl: 3    lidocaine-prilocaine (Emla) 2.5-2.5 % cream, Apply to treatment area 1 hour prior to injection., Disp: 30 g, Rfl: 2    prenatal no115/iron/folic acid (PRENATAL 19 ORAL), Take by mouth., Disp: , Rfl:     progesterone 50 mg/mL injection, Inject 1.5 mL (75 mg) into the muscle once daily. Inject into the muscle at the same time each morning as directed per provider., Disp: 50 mL, Rfl: 3    progesterone 50 mg/mL injection, Inject 1.5 mL (75 mg) into the muscle once daily. Inject into the muscle at the same time each  "morning as directed per provider., Disp: 50 mL, Rfl: 3    transparent dressings 2 3/8 X 2 3/4 \" bandage, Use as directed to cover lidocaine cream., Disp: 30 each, Rfl: 3    PNV: Yes  Vitamin D 2000 IUs: No  Luteal support: IM SHANNON 75 mg daily  Additional medications: ESTRACE 6MG,     Phone call to patient to discuss results of blood pregnancy test. Shared with patient that HCG level is 402 and they are 4 weeks and 1 days pregnant. Reviewed with patient that we are cautiously optimistic that this is a good level. Reviewed with patient to continue all current medications. Advised patient this RN will review result with provider later today and contact via MC with next step which is likely repeat level in one week.  She verbalizes understanding and is agreeable.    Cristobal Nuñez 06/13/24 9:36 AM      Cristobal Nuñez  06/13/2024  9:28 AM    Monitoring patient: FET on 6-3-2024, 1st HCG Level today appropriate at 402. Patient to repeat an HCG Level in 1 week + TSH Level. Patient to continue current FET medications. Plan to be communicated by RN. Plan agreed upon by Dr. Michaud. Kimber Monson, MAYA 06/13/24 12:10 PM     My Chart message sent to patient with above noted plan and reviewed with patient to reach out to their primary OBGYN to schedule new OB visit at this time as patient needs to be seen by 9 weeks gestation.   Cristobal Nuñez 06/13/24 1:18 PM              "

## 2024-06-20 ENCOUNTER — LAB (OUTPATIENT)
Dept: LAB | Facility: LAB | Age: 34
End: 2024-06-20
Payer: COMMERCIAL

## 2024-06-20 DIAGNOSIS — O36.80X0 ENCOUNTER TO DETERMINE FETAL VIABILITY OF PREGNANCY, NOT APPLICABLE OR UNSPECIFIED FETUS (HHS-HCC): ICD-10-CM

## 2024-06-20 DIAGNOSIS — Z13.29 SCREENING FOR THYROID DISORDER: ICD-10-CM

## 2024-06-20 DIAGNOSIS — Z32.01 POSITIVE BLOOD PREGNANCY TEST (HHS-HCC): ICD-10-CM

## 2024-06-20 LAB
B-HCG SERPL-ACNC: 4689 MIU/ML
TSH SERPL-ACNC: 1.78 MIU/L (ref 0.44–3.98)

## 2024-06-20 PROCEDURE — 36415 COLL VENOUS BLD VENIPUNCTURE: CPT

## 2024-06-20 PROCEDURE — 84702 CHORIONIC GONADOTROPIN TEST: CPT

## 2024-06-20 PROCEDURE — 84443 ASSAY THYROID STIM HORMONE: CPT

## 2024-06-20 NOTE — PROGRESS NOTES
Patient reporting to outpatient  lab for one week follow-up HCG post-FET.    Initial HC (), 4,689 (2024)  Patient's CRYSTAL Provider: Arabella Michaud MD  Infertility dx: Unexplained Infertility  Achieved pregnancy by: Embryo transfer  Fertility medications used this cycle:   LMP: Patient's last menstrual period was 24  EGA based on (IUI date, ET ): embryo transfer date = 5 0/7 weeks     Updated G/P with this pregnancy:   OB HX:                   OB History    Para Term  AB Living   2 1           SAB IAB Ectopic Multiple Live Births                         # Outcome Date GA Lbr Clark/2nd Weight Sex Delivery Anes PTL Lv   2 Current                     1 Para                           Type & Screen: B POS ANTIBODY NEG  History of miscarriage: No  History of pelvic/abdominal surgeries: No  History of STDs/PID: No  Hx of ectopic: No  Hx of tubal disease/ blockage: No     Risk for ectopic: No        Current medications:      Current Outpatient Medications:     cholecalciferol (Vitamin D-3) 25 MCG (1000 UT) tablet, Take by mouth., Disp: , Rfl:     estradiol (Estrace) 2 mg tablet, Take 3 tablets (6 mg) by mouth once daily., Disp: 90 tablet, Rfl: 3    estradiol (Estrace) 2 mg tablet, Take 3 tablets (6 mg) by mouth once daily., Disp: 90 tablet, Rfl: 2    estradiol (Estrace) 2 mg tablet, Insert 1 tablet (2 mg) into the vagina 2 times a day., Disp: , Rfl:     levothyroxine (Synthroid, Levoxyl) 50 mcg tablet, Take 1 tablet (50 mcg) by mouth once daily., Disp: 30 tablet, Rfl: 3    lidocaine-prilocaine (Emla) 2.5-2.5 % cream, Apply to treatment area 1 hour prior to injection., Disp: 30 g, Rfl: 2    prenatal no115/iron/folic acid (PRENATAL 19 ORAL), Take by mouth., Disp: , Rfl:     progesterone 50 mg/mL injection, Inject 1.5 mL (75 mg) into the muscle once daily. Inject into the muscle at the same time each morning as directed per provider., Disp: 50 mL, Rfl: 3    progesterone 50 mg/mL injection,  "Inject 1.5 mL (75 mg) into the muscle once daily. Inject into the muscle at the same time each morning as directed per provider., Disp: 50 mL, Rfl: 3    transparent dressings 2 3/8 X 2 3/4 \" bandage, Use as directed to cover lidocaine cream., Disp: 30 each, Rfl: 3     PNV: Yes  Vitamin D 2000 IUs: No  Luteal support: IM SHANNON 75 mg daily  Additional medications: ESTRACE 6MG      Will contact patient with next steps pending provider review of lab result.     LIOR HARVEY RN 06/20/2024 08:27    [facility]  Ultrasound and/or labs reviewed at [facility].   Results for orders placed or performed in visit on 06/20/24 (from the past 96 hour(s))   (Lab Collect) Human Chorionic Gonadotropin, Serum Quantitative   Result Value Ref Range    HCG, Beta-Quantitative 4,689 (H) <5 mIU/mL   TSH with reflex to Free T4 if abnormal   Result Value Ref Range    Thyroid Stimulating Hormone 1.78 0.44 - 3.98 mIU/L       RTC in AT APPROX 6 weeks 5 DAYS GESTATION for OB scan and  no labs .   Valerie Ha  06/20/2024  12:53 PM      Placed call to patient to review plan as listed above. Patient relayed that since she will be out of town 7/1-7/9, she was told to plan to come for scan on 6/27 -- although earlier than planning but at least to confirm IUP. Understands we may need to rescan her after she returns from trip if indication. Patient verbalized understanding of plan, all questions addressed at this time.   Transferred patient to schedule scan for 6/27.   LIOR HARVEY RN 06/20/2024 13:22     "

## 2024-06-21 ENCOUNTER — PHARMACY VISIT (OUTPATIENT)
Dept: PHARMACY | Facility: CLINIC | Age: 34
End: 2024-06-21
Payer: COMMERCIAL

## 2024-06-21 ENCOUNTER — SPECIALTY PHARMACY (OUTPATIENT)
Dept: PHARMACY | Facility: CLINIC | Age: 34
End: 2024-06-21

## 2024-06-21 PROCEDURE — RXMED WILLOW AMBULATORY MEDICATION CHARGE

## 2024-06-27 ENCOUNTER — OFFICE VISIT (OUTPATIENT)
Dept: ENDOCRINOLOGY | Facility: CLINIC | Age: 34
End: 2024-06-27
Payer: COMMERCIAL

## 2024-06-27 ENCOUNTER — ANCILLARY PROCEDURE (OUTPATIENT)
Dept: ENDOCRINOLOGY | Facility: CLINIC | Age: 34
End: 2024-06-27
Payer: COMMERCIAL

## 2024-06-27 ENCOUNTER — TELEPHONE (OUTPATIENT)
Dept: ENDOCRINOLOGY | Facility: CLINIC | Age: 34
End: 2024-06-27

## 2024-06-27 DIAGNOSIS — O36.80X0 ENCOUNTER TO DETERMINE FETAL VIABILITY OF PREGNANCY, NOT APPLICABLE OR UNSPECIFIED FETUS (HHS-HCC): ICD-10-CM

## 2024-06-27 DIAGNOSIS — O36.80X0 ENCOUNTER TO DETERMINE FETAL VIABILITY OF PREGNANCY, SINGLE OR UNSPECIFIED FETUS (HHS-HCC): Primary | ICD-10-CM

## 2024-06-27 DIAGNOSIS — O36.80X0 ENCOUNTER TO DETERMINE FETAL VIABILITY OF PREGNANCY, NOT APPLICABLE OR UNSPECIFIED FETUS (HHS-HCC): Primary | ICD-10-CM

## 2024-06-27 PROCEDURE — 76817 TRANSVAGINAL US OBSTETRIC: CPT

## 2024-06-27 PROCEDURE — 99212 OFFICE O/P EST SF 10 MIN: CPT

## 2024-06-27 NOTE — PROGRESS NOTES
Visit Type: In Person    OB Scan    Ectopic risk factor: no  PGTA/PGTM: no  Blood type: B+  Method of Conception: Frozen Embryo transfer  6-3-2024  Meds: PNV daily, Estrace 6 mg p.o. daily, SHANNON 75 mg IM daily,     Reviewed results from OB ultrasound today and results reviewed with pt as follows:     OB Scan results:   Conception 6 day FET on 6/3/2024: 6 w + 2 d, KYLEE 2/18/2025  U/S 6/27/2024 based upon CRL 6 w + 3 d, KYLEE 2/17/2025  Assessment Gestational sac: visualized. Location: intrauterine  Yolk sac: visualized  Embryo: visualized  Cardiac activity: present  Early placenta: circumferential  YS 2.9 mm   CRL 4.4 mm 6w 3d    bpm  Size = dates    Detailed discussion with patient about her scan results, pregnancy, and next steps:    Plan:   Reviewed medications in detail. She will continue PNV, Estrace, & SHANNON   Reviewed supplemental progesterone, route and dose, reviewed dates of cessation. Last day to take Estrace & SHANNON is on 7-.  Discussed with patient any pregnancy concerns and discussed establishing care with an  OB. Will schedule with Dr. EVA Bellamy.  Will provide recommendations if she desires.   Advised to call with bleeding, pain or concerns- denies all of these.  Repeat scan since she is so early- 7-, oot until 7-9-2024.    Ultrasound results and Plan of care reviewed with MD Kimber Otto, CNP 06/27/24 2:01 PM

## 2024-06-27 NOTE — TELEPHONE ENCOUNTER
Reason for call:   Notes: Patient had an appt today and when she got she started bleeding and would like to speak with her nurse

## 2024-06-27 NOTE — TELEPHONE ENCOUNTER
"1600: called pt. She states at 1500 today she had a \"gush\" of liquidy red bleeding\" and some dripped down her leg. C/o mild cramping. OB scan today reassuring with FHR noted. I spoke with FORTINO Monson NP about pt complaints. She states pt should monitor overnight and call us in morning with an update, but if she starts bleeding heavily (soaking a pad/her) passing clots larger than size of egg, increased pain- she is to go to ER. Pt has repeat OB scan scheduled for 7/11. She is going to be out of town 6/30-7/9.  Rocco Cunningham 06/27/24 16:10/Kimber Monson CNP 06/27/24 5:18 PM     Called pt to discuss plan and detailed reasons to go to ER. She states she has had a pad on since 1500 and there is no bleeding on it. She will call us tomorrow morning with an update, and if bleeding continues overnight, she may need to come in again tomorrow for another US per FORTINO Monson, ELOINA. Reassurance given topt that this can be normal in early pregnancy. Pt verbalized understanding and is agreeable to this plan.  Rocco Cunningham 06/27/24 5:02 PM        "

## 2024-06-28 ENCOUNTER — TELEPHONE (OUTPATIENT)
Dept: ENDOCRINOLOGY | Facility: CLINIC | Age: 34
End: 2024-06-28
Payer: COMMERCIAL

## 2024-06-28 ENCOUNTER — DOCUMENTATION (OUTPATIENT)
Dept: ENDOCRINOLOGY | Facility: CLINIC | Age: 34
End: 2024-06-28
Payer: COMMERCIAL

## 2024-06-28 ENCOUNTER — OFFICE VISIT (OUTPATIENT)
Dept: ENDOCRINOLOGY | Facility: CLINIC | Age: 34
End: 2024-06-28
Payer: COMMERCIAL

## 2024-06-28 ENCOUNTER — TRANSCRIBE ORDERS (OUTPATIENT)
Dept: RADIOLOGY | Facility: CLINIC | Age: 34
End: 2024-06-28
Payer: COMMERCIAL

## 2024-06-28 ENCOUNTER — ANCILLARY PROCEDURE (OUTPATIENT)
Dept: ENDOCRINOLOGY | Facility: CLINIC | Age: 34
End: 2024-06-28
Payer: COMMERCIAL

## 2024-06-28 DIAGNOSIS — O36.80X0 ENCOUNTER TO DETERMINE FETAL VIABILITY OF PREGNANCY, NOT APPLICABLE OR UNSPECIFIED FETUS (HHS-HCC): Primary | ICD-10-CM

## 2024-06-28 DIAGNOSIS — N91.2 AMENORRHEA: Primary | ICD-10-CM

## 2024-06-28 DIAGNOSIS — O36.80X0 ENCOUNTER TO DETERMINE FETAL VIABILITY OF PREGNANCY, NOT APPLICABLE OR UNSPECIFIED FETUS (HHS-HCC): ICD-10-CM

## 2024-06-28 PROCEDURE — 99212 OFFICE O/P EST SF 10 MIN: CPT

## 2024-06-28 PROCEDURE — 76817 TRANSVAGINAL US OBSTETRIC: CPT

## 2024-06-28 NOTE — PROGRESS NOTES
"Call to patient, had an OB Scan done yesterday with a viable single IUP with  BPM  at 6.2 wks from FET on 6-3-2024 and stood up yesterday while at home and had a gush of red blood with some menstrual cramping, it did lessen yesterday ay 2100 and today is very light and darker red blood when wiping with slight cramp on LLQ, no real pain, feels like a \"twinge\". Will have pt come in today for a repeat OB Scan- she is leaving to go OOT Sunday thru 7-9-2024. Kimber Monson, MAYA 06/28/24 8:43 AM   "

## 2024-06-28 NOTE — PROGRESS NOTES
"  Visit Type: In Person    Repeat OB Scan    Ectopic risk factor: no  PGTA/PGTM: no  Blood type: B+  Method of Conception: Frozen Embryo transfer  6-3-2024  Meds: PNV daily, Estrace 6 mg p.o. daily, SHANNON 75 mg IM daily,     Reviewed results from OB ultrasound today and results reviewed with pt as follows:     OB Scan results:   Conception  6 day FET on 6/3/2024: 6 w + 3 d, KYLEE 2/18/2025  Stated KYLEE 6 w + 3 d, KYLEE 2/18/2025  U/S 6/28/2024 based upon CRL 6 w + 5 d, KYLEE 2/16/2025  Assessment Gestational sac: visualized. Location: intrauterine  Yolk sac: visualized  Embryo: visualized  Cardiac activity: present  Early placenta: circumferential  YS 2.9 mm   CRL 6.4 mm 6w 5d   bpm  Size = dates   KENY measuring 36 x 10 x 36mm    Detailed discussion with patient about her scan results, pregnancy, and next steps:    Plan:   Reviewed medications in detail. She will continue PNV, Estrace, & SHANNON    Reviewed supplemental progesterone, route and dose, reviewed dates of cessation.  Last day to take Estrace & SHANNON is on 7-.   Discussed with patient any pregnancy concerns and discussed establishing care with an  OB.  Will schedule with Dr. EVA Bellamy.    Will provide recommendations if she desires.   Advised to call with bleeding, pain or concerns. Pt had an OB Scan done yesterday with a viable single IUP with  BPM at 6.2 wks from FET on 6-3-2024 and stood up yesterday while at home and had a gush of red blood with some menstrual cramping, it did lessen yesterday ay 2100 and today is very light and darker red blood when wiping with slight cramp on LLQ, no real pain, feels like a \"twinge\".   Repeat scan 7-3-2024 & again- 7-, pt will likely cancel her trip oot next week, advised should not go on trip as she is going out of state and remote in some areas.   Discussed KENY- etiology, precautions, treatment: recommend Pelvic rest- modified activity, no intercourse.    Ultrasound results and Plan of care reviewed " with MD Kimber Boss, CNP 06/28/24 2:19 PM

## 2024-06-28 NOTE — TELEPHONE ENCOUNTER
Reason for call: Call Back  Notes: Pt stated her bleeding has lessened and is a darker red than the bright red yesterday. She's ~6 weeks pregnant.

## 2024-06-28 NOTE — TELEPHONE ENCOUNTER
Returned patient call regarding bleeding episode. Patient saw Kimber Monson CNP yesterday for OB scan and called afterwards with bleeding episode. Patient states bleeding today is less than yesterday and darker in color. Patient states she is not noticing blood on a pad but a small amount when she wipes. She described the bleeding as similar to the second to last day/last day of menses. Patient states she has very minimal cramping noted to her left side but is not concerned by this cramping. Patient instructed this RN  will discuss with Kimber Monson CNP. Kimber Monson CNP to call patient.   JOSE MUNOZ on 6/28/24 at 8:57 AM.

## 2024-07-03 ENCOUNTER — ANCILLARY PROCEDURE (OUTPATIENT)
Dept: ENDOCRINOLOGY | Facility: CLINIC | Age: 34
End: 2024-07-03
Payer: COMMERCIAL

## 2024-07-03 ENCOUNTER — OFFICE VISIT (OUTPATIENT)
Dept: ENDOCRINOLOGY | Facility: CLINIC | Age: 34
End: 2024-07-03
Payer: COMMERCIAL

## 2024-07-03 DIAGNOSIS — O36.80X0 ENCOUNTER TO DETERMINE FETAL VIABILITY OF PREGNANCY, NOT APPLICABLE OR UNSPECIFIED FETUS (HHS-HCC): ICD-10-CM

## 2024-07-03 DIAGNOSIS — O36.80X0 ENCOUNTER TO DETERMINE FETAL VIABILITY OF PREGNANCY, SINGLE OR UNSPECIFIED FETUS (HHS-HCC): Primary | ICD-10-CM

## 2024-07-03 PROCEDURE — 76817 TRANSVAGINAL US OBSTETRIC: CPT

## 2024-07-03 PROCEDURE — 99213 OFFICE O/P EST LOW 20 MIN: CPT | Performed by: NURSE PRACTITIONER

## 2024-07-03 NOTE — PROGRESS NOTES
Visit Type: In Person    OB Scan    Ectopic risk factor: no  PGTA/PGTM: no  Blood type: B+  Method of Conception: Frozen Embryo transfer     Reviewed results from OB ultrasound today and results reviewed with pt as follows:     OB Scan results:   Concep??on Concep??on: IVF Embryo Transfer  Embryo  transfer  6/3/2024 IVF / ET: 6 d 7 w + 1 d 2/18/2025  Stated KYLEE 7 w + 1 d 2/18/2025  U/S 7/3/2024 based upon CRL 7 w + 0 d 2/19/2025  Assigned  da??ng  based on the IVF / ET date, selected on  06/27/2024  7 w + 1 d 2/18/2025  Assessment Gesta??onal sac: visualized. Loca??on: intrauterine  Yolk sac: visualized  Embryo: visualized  Cardiac ac??vity: present  Early placenta: circumferen??al  YS 2.4 mm -/- <1% Papaioannou  CRL 7.5 mm 7w 0d 24% Pexsters   bpm    KENY noted.     Detailed discussion with patient about her scan results, pregnancy, and next steps:    Plan:   Reviewed medications in detail. She will continue PNV.   Reviewed supplemental progesterone, route and dose, reviewed dates of cessation. 10 weeks 6 days.  Discussed with patient any pregnancy concerns and discussed establishing care with an  OB.  Will provide recommendations if she desires.   Advised to call with bleeding, pain or concerns.    Ultrasound results and Plan of care reviewed with Dr. Arabella Michaud MD    Fertility Plan Update: already had appointment to repeat on 7/11. Will continue with this plan    Vanessa JARQUIN Miguel  07/03/2024  3:47 PM

## 2024-07-11 ENCOUNTER — OFFICE VISIT (OUTPATIENT)
Dept: ENDOCRINOLOGY | Facility: CLINIC | Age: 34
End: 2024-07-11
Payer: COMMERCIAL

## 2024-07-11 ENCOUNTER — ANCILLARY PROCEDURE (OUTPATIENT)
Dept: ENDOCRINOLOGY | Facility: CLINIC | Age: 34
End: 2024-07-11
Payer: COMMERCIAL

## 2024-07-11 DIAGNOSIS — O36.80X0 ENCOUNTER TO DETERMINE FETAL VIABILITY OF PREGNANCY, SINGLE OR UNSPECIFIED FETUS (HHS-HCC): Primary | ICD-10-CM

## 2024-07-11 DIAGNOSIS — O36.80X0 ENCOUNTER TO DETERMINE FETAL VIABILITY OF PREGNANCY, NOT APPLICABLE OR UNSPECIFIED FETUS (HHS-HCC): ICD-10-CM

## 2024-07-11 PROCEDURE — 99212 OFFICE O/P EST SF 10 MIN: CPT

## 2024-07-11 PROCEDURE — 76817 TRANSVAGINAL US OBSTETRIC: CPT | Performed by: OBSTETRICS & GYNECOLOGY

## 2024-07-11 PROCEDURE — 76817 TRANSVAGINAL US OBSTETRIC: CPT

## 2024-07-11 NOTE — PROGRESS NOTES
"  Visit Type: In Person    OB Scan    Ectopic risk factor: no  PGTA/PGTM: no  Blood type: B+  Method of Conception: Frozen Embryo transfer   Meds: PNV daily, Estrace 6 mg p.o. daily, & SHANNON 75 mg IM daily    Reviewed results from OB ultrasound today and results reviewed with pt as follows:     OB Scan results:   Pregnancy Rios pregnancy. Number of embryos: 1  Dating Date Details Gest. age KYLEE  Conception 6 day FET on 6/3/2024: 8 w + 2 d, KYLEE 2/18/2025  Stated KYLEE 8 w + 2 d, KYLEE 2/18/2025  U/S 7/11/2024 based upon CRL 8 w + 2 d, KYLEE 2/18/2025  Assessment Gestational sac: visualized. Location: intrauterine  Yolk sac: visualized  Embryo: visualized  Cardiac activity: present  Early placenta: circumferential  YS 3.6 mm 6w 3d   CRL 18.0 mm 8w 2d    bpm  KENY measuring 24.1/45.1/7.7 mm- is smaller from prior OB Scan.   Size = dates    Detailed discussion with patient about her scan results, pregnancy, and next steps:    Plan:   Reviewed medications in detail. She will continue PNV,  Estrace, & SHANNON.  Reviewed supplemental progesterone, route and dose, reviewed dates of cessation.  Last day to take Estrace & SHANNON is on 7-.    Discussed with patient any pregnancy concerns and discussed establishing care with an  OB. Has a new OB visit scheduled on 7- with DR. Bellamy.  Will provide recommendations if she desires.   Advised to call with bleeding, pain or concerns. C/O scant brown discharge yesterday, nothing today and \"twinges\", no pain/cramping.   Discussed KENY- etiology, precautions, treatment: recommend Pelvic rest- modified activity, no intercourse      Ultrasound results and Plan of care reviewed with MD Kimber Otto,CNP 07/11/24 4:44 PM     "

## 2024-07-22 ENCOUNTER — APPOINTMENT (OUTPATIENT)
Dept: OBSTETRICS AND GYNECOLOGY | Facility: CLINIC | Age: 34
End: 2024-07-22
Payer: COMMERCIAL

## 2024-07-22 ENCOUNTER — APPOINTMENT (OUTPATIENT)
Dept: RADIOLOGY | Facility: CLINIC | Age: 34
End: 2024-07-22
Payer: COMMERCIAL

## 2024-07-22 VITALS — WEIGHT: 147.9 LBS | DIASTOLIC BLOOD PRESSURE: 68 MMHG | SYSTOLIC BLOOD PRESSURE: 114 MMHG | BODY MASS INDEX: 27.95 KG/M2

## 2024-07-22 DIAGNOSIS — N91.2 AMENORRHEA: ICD-10-CM

## 2024-07-22 DIAGNOSIS — O00.01: Primary | ICD-10-CM

## 2024-07-22 DIAGNOSIS — N91.1 AMENORRHEA, SECONDARY: ICD-10-CM

## 2024-07-22 PROBLEM — Z86.32 HISTORY OF DIET CONTROLLED GESTATIONAL DIABETES MELLITUS (GDM): Status: ACTIVE | Noted: 2024-07-22

## 2024-07-22 PROBLEM — Z31.83 IN VITRO FERTILIZATION: Status: ACTIVE | Noted: 2024-07-22

## 2024-07-22 PROBLEM — E03.8 OTHER SPECIFIED HYPOTHYROIDISM: Status: ACTIVE | Noted: 2024-07-22

## 2024-07-22 LAB — PREGNANCY TEST URINE, POC: POSITIVE

## 2024-07-22 PROCEDURE — 36415 COLL VENOUS BLD VENIPUNCTURE: CPT

## 2024-07-22 PROCEDURE — 0500F INITIAL PRENATAL CARE VISIT: CPT | Performed by: OBSTETRICS & GYNECOLOGY

## 2024-07-22 PROCEDURE — 81025 URINE PREGNANCY TEST: CPT | Performed by: OBSTETRICS & GYNECOLOGY

## 2024-07-22 PROCEDURE — 87491 CHLMYD TRACH DNA AMP PROBE: CPT

## 2024-07-22 PROCEDURE — 87591 N.GONORRHOEAE DNA AMP PROB: CPT

## 2024-07-22 ASSESSMENT — EDINBURGH POSTNATAL DEPRESSION SCALE (EPDS)
I HAVE BLAMED MYSELF UNNECESSARILY WHEN THINGS WENT WRONG: NO, NEVER
I HAVE FELT SAD OR MISERABLE: NO, NOT AT ALL
I HAVE BEEN ANXIOUS OR WORRIED FOR NO GOOD REASON: NO, NOT AT ALL
TOTAL SCORE: 0
I HAVE LOOKED FORWARD WITH ENJOYMENT TO THINGS: AS MUCH AS I EVER DID
THINGS HAVE BEEN GETTING ON TOP OF ME: NO, I HAVE BEEN COPING AS WELL AS EVER
I HAVE FELT SCARED OR PANICKY FOR NO GOOD REASON: NO, NOT AT ALL
I HAVE BEEN SO UNHAPPY THAT I HAVE HAD DIFFICULTY SLEEPING: NOT AT ALL
THE THOUGHT OF HARMING MYSELF HAS OCCURRED TO ME: NEVER
I HAVE BEEN ABLE TO LAUGH AND SEE THE FUNNY SIDE OF THINGS: AS MUCH AS I ALWAYS COULD
I HAVE BEEN SO UNHAPPY THAT I HAVE BEEN CRYING: NO, NEVER

## 2024-07-22 NOTE — PROGRESS NOTES
Subjective   Patient ID [id number]   Almaz Bates is a 34 y.o.  at Unknown with a working estimated date of delivery of Not found. who presents for an initial prenatal visit. This pregnancy is planned.    Her pregnancy is complicated by:  IVF  Subchorionic hemorrhage  Hypothyroid  H/o GDM    OB History    Para Term  AB Living   2 1       1   SAB IAB Ectopic Multiple Live Births           1      # Outcome Date GA Lbr Clark/2nd Weight Sex Type Anes PTL Lv   2 Current            1 Para     F Vag-Spont   JOSE ALFREDO     [place]  Depression Scale Total: 0    Objective   Physical Exam  Weight: 67.1 kg (147 lb 14.4 oz)  Expected Total Weight Gain: 7 kg (15 lb)-11.5 kg (25 lb)   Pregravid BMI: 27.22  BP: 114/68          OBGyn Exam  Egbus normal  Vagina  no lesions no bleeding  Cervix no lesions  Prenatal Labs  Ordered  hgba1c    Assessment/Plan   Problem List Items Addressed This Visit    None  Visit Diagnoses         Codes    Pregnancy, abdominal, with intrauterine pregnancy ([facility])     O00.01            Immunizations:   Prenatal Labs ordered, hgba1c - will draw in 2 weeks with NIPT  Daily prenatal vitamins prescribed  First trimester screening and second trimester screening discussed. Patient decided to schedule NIPT  TSH qtrimester  Follow up in 4 weeks for return OB visit.

## 2024-07-23 LAB
C TRACH RRNA SPEC QL NAA+PROBE: NEGATIVE
N GONORRHOEA DNA SPEC QL PROBE+SIG AMP: NEGATIVE

## 2024-08-05 ENCOUNTER — APPOINTMENT (OUTPATIENT)
Dept: OBSTETRICS AND GYNECOLOGY | Facility: CLINIC | Age: 34
End: 2024-08-05
Payer: COMMERCIAL

## 2024-08-05 ENCOUNTER — APPOINTMENT (OUTPATIENT)
Dept: RADIOLOGY | Facility: CLINIC | Age: 34
End: 2024-08-05
Payer: COMMERCIAL

## 2024-08-05 ENCOUNTER — HOSPITAL ENCOUNTER (OUTPATIENT)
Dept: RADIOLOGY | Facility: CLINIC | Age: 34
Discharge: HOME | End: 2024-08-05
Payer: COMMERCIAL

## 2024-08-05 ENCOUNTER — TRANSCRIBE ORDERS (OUTPATIENT)
Dept: OBSTETRICS AND GYNECOLOGY | Facility: CLINIC | Age: 34
End: 2024-08-05

## 2024-08-05 VITALS — DIASTOLIC BLOOD PRESSURE: 70 MMHG | SYSTOLIC BLOOD PRESSURE: 110 MMHG | WEIGHT: 148 LBS | BODY MASS INDEX: 27.96 KG/M2

## 2024-08-05 DIAGNOSIS — Z34.81 MULTIGRAVIDA IN FIRST TRIMESTER (HHS-HCC): ICD-10-CM

## 2024-08-05 DIAGNOSIS — Z36.82 ENCOUNTER FOR ANTENATAL SCREENING FOR NUCHAL TRANSLUCENCY (HHS-HCC): Primary | ICD-10-CM

## 2024-08-05 DIAGNOSIS — Z36.82 ENCOUNTER FOR ANTENATAL SCREENING FOR NUCHAL TRANSLUCENCY (HHS-HCC): ICD-10-CM

## 2024-08-05 DIAGNOSIS — Z86.32 HISTORY OF DIET CONTROLLED GESTATIONAL DIABETES MELLITUS (GDM): ICD-10-CM

## 2024-08-05 DIAGNOSIS — E03.9 HYPOTHYROIDISM, UNSPECIFIED TYPE: Primary | ICD-10-CM

## 2024-08-05 LAB
ERYTHROCYTE [DISTWIDTH] IN BLOOD BY AUTOMATED COUNT: 13.2 % (ref 11.5–14.5)
HCT VFR BLD AUTO: 36.5 % (ref 36–46)
HGB BLD-MCNC: 12 G/DL (ref 12–16)
MCH RBC QN AUTO: 30.1 PG (ref 26–34)
MCHC RBC AUTO-ENTMCNC: 32.9 G/DL (ref 32–36)
MCV RBC AUTO: 92 FL (ref 80–100)
NRBC BLD-RTO: 0 /100 WBCS (ref 0–0)
PLATELET # BLD AUTO: 263 X10*3/UL (ref 150–450)
RBC # BLD AUTO: 3.99 X10*6/UL (ref 4–5.2)
T4 FREE SERPL-MCNC: 1.5 NG/DL (ref 0.78–1.48)
TSH SERPL-ACNC: 0.23 MIU/L (ref 0.44–3.98)
WBC # BLD AUTO: 10.2 X10*3/UL (ref 4.4–11.3)

## 2024-08-05 PROCEDURE — 86803 HEPATITIS C AB TEST: CPT

## 2024-08-05 PROCEDURE — 85027 COMPLETE CBC AUTOMATED: CPT

## 2024-08-05 PROCEDURE — 86901 BLOOD TYPING SEROLOGIC RH(D): CPT

## 2024-08-05 PROCEDURE — 76813 OB US NUCHAL MEAS 1 GEST: CPT | Performed by: OBSTETRICS & GYNECOLOGY

## 2024-08-05 PROCEDURE — 86317 IMMUNOASSAY INFECTIOUS AGENT: CPT

## 2024-08-05 PROCEDURE — 86780 TREPONEMA PALLIDUM: CPT

## 2024-08-05 PROCEDURE — 84439 ASSAY OF FREE THYROXINE: CPT

## 2024-08-05 PROCEDURE — 0501F PRENATAL FLOW SHEET: CPT | Performed by: OBSTETRICS & GYNECOLOGY

## 2024-08-05 PROCEDURE — 86850 RBC ANTIBODY SCREEN: CPT

## 2024-08-05 PROCEDURE — 36415 COLL VENOUS BLD VENIPUNCTURE: CPT

## 2024-08-05 PROCEDURE — 86900 BLOOD TYPING SEROLOGIC ABO: CPT

## 2024-08-05 PROCEDURE — 84443 ASSAY THYROID STIM HORMONE: CPT

## 2024-08-05 PROCEDURE — 87340 HEPATITIS B SURFACE AG IA: CPT

## 2024-08-05 PROCEDURE — 86787 VARICELLA-ZOSTER ANTIBODY: CPT

## 2024-08-05 PROCEDURE — 87086 URINE CULTURE/COLONY COUNT: CPT

## 2024-08-05 PROCEDURE — 87389 HIV-1 AG W/HIV-1&-2 AB AG IA: CPT

## 2024-08-06 ENCOUNTER — TELEPHONE (OUTPATIENT)
Dept: ENDOCRINOLOGY | Facility: CLINIC | Age: 34
End: 2024-08-06
Payer: COMMERCIAL

## 2024-08-06 LAB
ABO GROUP (TYPE) IN BLOOD: NORMAL
ANTIBODY SCREEN: NORMAL
HBV SURFACE AG SERPL QL IA: NONREACTIVE
HCV AB SER QL: NONREACTIVE
HIV 1+2 AB+HIV1 P24 AG SERPL QL IA: NONREACTIVE
RH FACTOR (ANTIGEN D): NORMAL
RUBV IGG SERPL IA-ACNC: 2.1 IA
RUBV IGG SERPL QL IA: POSITIVE
TREPONEMA PALLIDUM IGG+IGM AB [PRESENCE] IN SERUM OR PLASMA BY IMMUNOASSAY: NONREACTIVE
VARICELLA ZOSTER IGG INDEX: 5.2 IA
VZV IGG SER QL IA: POSITIVE

## 2024-08-06 NOTE — TELEPHONE ENCOUNTER
RN returned patient's call. Per our nurse practitioner team patient is now graduated from our practice, Synthroid adjustments need to be made by her OB who is currently caring for and following the patient. Patient verbalized understanding and will contact her OB's office.   Ivana Grissom RN 08/06/24 3:59 PM

## 2024-08-07 ENCOUNTER — TELEPHONE (OUTPATIENT)
Dept: OBSTETRICS AND GYNECOLOGY | Facility: CLINIC | Age: 34
End: 2024-08-07
Payer: COMMERCIAL

## 2024-08-07 LAB — BACTERIA UR CULT: NO GROWTH

## 2024-08-07 NOTE — TELEPHONE ENCOUNTER
Patient would rather talk via phone call to discuss thyroid medication than MyChart, please call.

## 2024-08-22 ENCOUNTER — APPOINTMENT (OUTPATIENT)
Dept: PRIMARY CARE | Facility: CLINIC | Age: 34
End: 2024-08-22
Payer: COMMERCIAL

## 2024-08-22 VITALS
BODY MASS INDEX: 28.51 KG/M2 | DIASTOLIC BLOOD PRESSURE: 78 MMHG | RESPIRATION RATE: 16 BRPM | SYSTOLIC BLOOD PRESSURE: 118 MMHG | OXYGEN SATURATION: 98 % | HEART RATE: 91 BPM | HEIGHT: 61 IN | WEIGHT: 151 LBS

## 2024-08-22 DIAGNOSIS — E03.9 HYPOTHYROIDISM, UNSPECIFIED TYPE: Primary | ICD-10-CM

## 2024-08-22 PROCEDURE — 3008F BODY MASS INDEX DOCD: CPT | Performed by: FAMILY MEDICINE

## 2024-08-22 PROCEDURE — 1036F TOBACCO NON-USER: CPT | Performed by: FAMILY MEDICINE

## 2024-08-22 PROCEDURE — 99203 OFFICE O/P NEW LOW 30 MIN: CPT | Performed by: FAMILY MEDICINE

## 2024-08-22 RX ORDER — LEVOTHYROXINE SODIUM 25 UG/1
25 TABLET ORAL DAILY
COMMUNITY

## 2024-08-22 NOTE — PATIENT INSTRUCTIONS
States OB is checking TSH next month.  If TSH normal, continue current Synthroid dose and call or send message through [facility] when refill needed.  If TSH abnormal, call or send message through [facility] notifying us that TSH is abnormal and we will make medication adjustments accordingly.    F/U 6 months: Annual wellness visit.

## 2024-08-22 NOTE — PROGRESS NOTES
"Subjective   Patient ID: Almaz Bates is a 34 y.o. female who presents for to [facility] care and Med Refill (Discuss med adjustment /Levothyroxine ).    HPI   Initial visit    Has Hypothyroidism.  Dx by reproductive endo when she started IVF.  No longer seeing reproductive endo.  Last TSH low, so OB decreased her Synthroid and is repeating her TSH next month.  OB told her to establish w/PCP for future refills.  Does not need med refills at this time.    Of note:  Pregnant, due date 2/18/25.    Reviewed/Updated Active problem list, PMH, PSH, FH, SH, Meds, Allergies.     Review of Systems  No other complaints.     Objective   /78   Pulse 91   Resp 16   Ht 1.549 m (5' 1\")   Wt 68.5 kg (151 lb)   LMP 05/11/2024 (Exact Date)   SpO2 98%   BMI 28.53 kg/m²     Physical Exam  Constitutional:       General: She is not in acute distress.     Appearance: She is overweight.   Neck:      Thyroid: No thyromegaly.   Cardiovascular:      Rate and Rhythm: Normal rate and regular rhythm.      Heart sounds: Normal heart sounds. No murmur heard.     No friction rub. No gallop.   Pulmonary:      Effort: Pulmonary effort is normal.      Breath sounds: Normal breath sounds. No wheezing, rhonchi or rales.   Neurological:      Mental Status: She is oriented to person, place, and time.   Psychiatric:         Mood and Affect: Mood normal.         Behavior: Behavior normal.     Assessment/Plan   Diagnoses and all orders for this visit:  Hypothyroidism, unspecified type    States OB is checking TSH next month.  If TSH normal, continue current Synthroid dose and call or send message through [facility] when refill needed.  If TSH abnormal, call or send message through [facility] notifying us that TSH is abnormal and we will make medication adjustments accordingly.    F/U 6 months: Annual wellness visit.  "

## 2024-09-04 NOTE — ADDENDUM NOTE
Encounter addended by: Tommy Miguel MT on: 9/4/2024 12:49 PM   Actions taken: Charge Capture section accepted

## 2024-09-05 ENCOUNTER — APPOINTMENT (OUTPATIENT)
Dept: OBSTETRICS AND GYNECOLOGY | Facility: CLINIC | Age: 34
End: 2024-09-05
Payer: COMMERCIAL

## 2024-09-05 VITALS — BODY MASS INDEX: 28.72 KG/M2 | WEIGHT: 152 LBS | DIASTOLIC BLOOD PRESSURE: 60 MMHG | SYSTOLIC BLOOD PRESSURE: 120 MMHG

## 2024-09-05 DIAGNOSIS — E03.9 HYPOTHYROIDISM, UNSPECIFIED TYPE: Primary | ICD-10-CM

## 2024-09-05 DIAGNOSIS — Z34.82 MULTIGRAVIDA IN SECOND TRIMESTER (HHS-HCC): ICD-10-CM

## 2024-09-05 DIAGNOSIS — Z86.32 HISTORY OF DIET CONTROLLED GESTATIONAL DIABETES MELLITUS (GDM): ICD-10-CM

## 2024-09-05 LAB — TSH SERPL-ACNC: 1.18 MIU/L (ref 0.44–3.98)

## 2024-09-05 PROCEDURE — 84443 ASSAY THYROID STIM HORMONE: CPT

## 2024-09-05 PROCEDURE — 0501F PRENATAL FLOW SHEET: CPT | Performed by: OBSTETRICS & GYNECOLOGY

## 2024-10-07 ENCOUNTER — APPOINTMENT (OUTPATIENT)
Dept: OBSTETRICS AND GYNECOLOGY | Facility: CLINIC | Age: 34
End: 2024-10-07
Payer: COMMERCIAL

## 2024-10-07 ENCOUNTER — APPOINTMENT (OUTPATIENT)
Dept: RADIOLOGY | Facility: CLINIC | Age: 34
End: 2024-10-07
Payer: COMMERCIAL

## 2024-10-07 VITALS — BODY MASS INDEX: 30.23 KG/M2 | DIASTOLIC BLOOD PRESSURE: 70 MMHG | WEIGHT: 160 LBS | SYSTOLIC BLOOD PRESSURE: 118 MMHG

## 2024-10-07 DIAGNOSIS — Z86.32 HISTORY OF DIET CONTROLLED GESTATIONAL DIABETES MELLITUS (GDM): ICD-10-CM

## 2024-10-07 DIAGNOSIS — Z34.82 MULTIGRAVIDA IN SECOND TRIMESTER (HHS-HCC): ICD-10-CM

## 2024-10-07 DIAGNOSIS — Z36.2 ENCOUNTER FOR FOLLOW-UP ULTRASOUND OF FETAL ANATOMY: Primary | ICD-10-CM

## 2024-10-07 DIAGNOSIS — E03.9 HYPOTHYROIDISM, UNSPECIFIED TYPE: ICD-10-CM

## 2024-10-07 PROCEDURE — 0501F PRENATAL FLOW SHEET: CPT | Performed by: OBSTETRICS & GYNECOLOGY

## 2024-10-07 PROCEDURE — 76805 OB US >/= 14 WKS SNGL FETUS: CPT | Performed by: OBSTETRICS & GYNECOLOGY

## 2024-10-07 NOTE — PROGRESS NOTES
Needs Follow-up ultrasound for  heart views In 4 weeks    Problem List Items Addressed This Visit       History of diet controlled gestational diabetes mellitus (GDM)    Overview     1 hour GTT at 25 weeks          Other Visit Diagnoses       Encounter for follow-up ultrasound of fetal anatomy    -  Primary    Relevant Orders    US OB 14+ weeks anatomy scan    Multigravida in second trimester ([facility])        Hypothyroidism, unspecified type

## 2024-11-04 ENCOUNTER — APPOINTMENT (OUTPATIENT)
Dept: OBSTETRICS AND GYNECOLOGY | Facility: CLINIC | Age: 34
End: 2024-11-04
Payer: COMMERCIAL

## 2024-11-04 ENCOUNTER — APPOINTMENT (OUTPATIENT)
Dept: RADIOLOGY | Facility: CLINIC | Age: 34
End: 2024-11-04
Payer: COMMERCIAL

## 2024-11-04 VITALS — SYSTOLIC BLOOD PRESSURE: 116 MMHG | BODY MASS INDEX: 31.37 KG/M2 | DIASTOLIC BLOOD PRESSURE: 74 MMHG | WEIGHT: 166 LBS

## 2024-11-04 DIAGNOSIS — Z34.82 MULTIGRAVIDA IN SECOND TRIMESTER (HHS-HCC): ICD-10-CM

## 2024-11-04 DIAGNOSIS — E03.9 HYPOTHYROIDISM, UNSPECIFIED TYPE: ICD-10-CM

## 2024-11-04 DIAGNOSIS — Z36.2 ENCOUNTER FOR FOLLOW-UP ULTRASOUND OF FETAL ANATOMY: ICD-10-CM

## 2024-11-04 DIAGNOSIS — E66.9 OBESITY (BMI 30-39.9): Primary | ICD-10-CM

## 2024-11-04 LAB
GLUCOSE 1H P 50 G GLC PO SERPL-MCNC: 97 MG/DL
HCT VFR BLD AUTO: 34.2 % (ref 36–46)
HGB BLD-MCNC: 11.5 G/DL (ref 12–16)
TREPONEMA PALLIDUM IGG+IGM AB [PRESENCE] IN SERUM OR PLASMA BY IMMUNOASSAY: NONREACTIVE
TSH SERPL-ACNC: 1.17 MIU/L (ref 0.44–3.98)

## 2024-11-04 PROCEDURE — 82947 ASSAY GLUCOSE BLOOD QUANT: CPT

## 2024-11-04 PROCEDURE — 36415 COLL VENOUS BLD VENIPUNCTURE: CPT

## 2024-11-04 PROCEDURE — 0501F PRENATAL FLOW SHEET: CPT | Performed by: OBSTETRICS & GYNECOLOGY

## 2024-11-04 PROCEDURE — 84443 ASSAY THYROID STIM HORMONE: CPT

## 2024-11-04 PROCEDURE — 85014 HEMATOCRIT: CPT

## 2024-11-04 PROCEDURE — 86780 TREPONEMA PALLIDUM: CPT

## 2024-11-04 PROCEDURE — 76815 OB US LIMITED FETUS(S): CPT | Performed by: OBSTETRICS & GYNECOLOGY

## 2024-11-04 PROCEDURE — 85018 HEMOGLOBIN: CPT

## 2024-11-04 NOTE — PROGRESS NOTES
Problem List Items Addressed This Visit       Obesity (BMI 30-39.9) - Primary    Overview     EFW 30, 36W         Relevant Orders    US OB 14+ weeks anatomy scan     Other Visit Diagnoses       Multigravida in second trimester ([facility])        Relevant Orders    Glucose, 1 Hour Screen, Pregnancy    Hemoglobin and Hematocrit, Blood    Syphilis Screen with Reflex    Thyroid Stimulating Hormone    US OB 14+ weeks anatomy scan    Fetal nonstress test    Hypothyroidism, unspecified type        Relevant Orders    Thyroid Stimulating Hormone    Fetal nonstress test

## 2024-12-09 ENCOUNTER — APPOINTMENT (OUTPATIENT)
Dept: OBSTETRICS AND GYNECOLOGY | Facility: CLINIC | Age: 34
End: 2024-12-09
Payer: COMMERCIAL

## 2024-12-09 ENCOUNTER — APPOINTMENT (OUTPATIENT)
Dept: RADIOLOGY | Facility: CLINIC | Age: 34
End: 2024-12-09
Payer: COMMERCIAL

## 2024-12-09 VITALS — DIASTOLIC BLOOD PRESSURE: 64 MMHG | SYSTOLIC BLOOD PRESSURE: 110 MMHG | WEIGHT: 168 LBS | BODY MASS INDEX: 31.74 KG/M2

## 2024-12-09 DIAGNOSIS — Z34.83 MULTIGRAVIDA IN THIRD TRIMESTER (HHS-HCC): ICD-10-CM

## 2024-12-09 DIAGNOSIS — E66.9 OBESITY (BMI 30-39.9): ICD-10-CM

## 2024-12-09 DIAGNOSIS — Z34.82 MULTIGRAVIDA IN SECOND TRIMESTER (HHS-HCC): ICD-10-CM

## 2024-12-09 DIAGNOSIS — E03.8 OTHER SPECIFIED HYPOTHYROIDISM: Primary | ICD-10-CM

## 2024-12-09 PROCEDURE — 0501F PRENATAL FLOW SHEET: CPT | Performed by: OBSTETRICS & GYNECOLOGY

## 2024-12-09 PROCEDURE — 76819 FETAL BIOPHYS PROFIL W/O NST: CPT | Performed by: OBSTETRICS & GYNECOLOGY

## 2024-12-09 PROCEDURE — 76815 OB US LIMITED FETUS(S): CPT | Performed by: OBSTETRICS & GYNECOLOGY

## 2024-12-09 PROCEDURE — 76818 FETAL BIOPHYS PROFILE W/NST: CPT | Performed by: OBSTETRICS & GYNECOLOGY

## 2024-12-09 NOTE — PROGRESS NOTES
Problem List Items Addressed This Visit       Other specified hypothyroidism - Primary    Overview     TSH q. Trimester - do with 50gr  Synthroid   testing 34 weeks  Check TSH at 32 W         Relevant Orders    US fetal biophysical profile wo non stress testing    US OB 14+ weeks anatomy scan    Obesity (BMI 30-39.9)    Overview     EFW 30 42%  , 36W         Relevant Orders    US OB 14+ weeks anatomy scan     Other Visit Diagnoses       Multigravida in third trimester ([facility])        Relevant Orders    US OB 14+ weeks anatomy scan

## 2024-12-09 NOTE — PROCEDURES
Almaz Spainmatthewnafisa, a  at 30w2d with an KYLEE of 2/15/2025, by Last Menstrual Period, was seen at [facility] for a biophysical profile with nonstress test.            Biophysical Profile  Fetal Breathin  Fetal Movement: 2  Fetal Tone: 2  Fluid Volume: 2  Biophysical Profile Score (of 8): 8 /8

## 2024-12-20 ENCOUNTER — APPOINTMENT (OUTPATIENT)
Dept: OBSTETRICS AND GYNECOLOGY | Facility: CLINIC | Age: 34
End: 2024-12-20
Payer: COMMERCIAL

## 2024-12-20 VITALS — BODY MASS INDEX: 32.31 KG/M2 | WEIGHT: 171 LBS | SYSTOLIC BLOOD PRESSURE: 120 MMHG | DIASTOLIC BLOOD PRESSURE: 62 MMHG

## 2024-12-20 DIAGNOSIS — Z31.83 IN VITRO FERTILIZATION: Primary | ICD-10-CM

## 2024-12-20 DIAGNOSIS — E03.8 OTHER SPECIFIED HYPOTHYROIDISM: ICD-10-CM

## 2024-12-20 DIAGNOSIS — E66.9 OBESITY (BMI 30-39.9): ICD-10-CM

## 2024-12-20 DIAGNOSIS — Z86.32 HISTORY OF DIET CONTROLLED GESTATIONAL DIABETES MELLITUS (GDM): ICD-10-CM

## 2024-12-20 DIAGNOSIS — Z34.83 MULTIGRAVIDA IN THIRD TRIMESTER (HHS-HCC): ICD-10-CM

## 2024-12-20 LAB — TSH SERPL-ACNC: 0.56 MIU/L (ref 0.44–3.98)

## 2024-12-20 PROCEDURE — 90715 TDAP VACCINE 7 YRS/> IM: CPT | Performed by: OBSTETRICS & GYNECOLOGY

## 2024-12-20 PROCEDURE — 90471 IMMUNIZATION ADMIN: CPT | Performed by: OBSTETRICS & GYNECOLOGY

## 2024-12-20 PROCEDURE — 0501F PRENATAL FLOW SHEET: CPT | Performed by: OBSTETRICS & GYNECOLOGY

## 2024-12-20 PROCEDURE — 84443 ASSAY THYROID STIM HORMONE: CPT

## 2024-12-20 NOTE — PROGRESS NOTES
Problem List Items Addressed This Visit       In vitro fertilization - Primary    History of diet controlled gestational diabetes mellitus (GDM)    Overview     1 hour GTT at 25 weeks normal         Other specified hypothyroidism    Overview     TSH q. Trimester - do with 50gr  Synthroid   testing 34 weeks  Check TSH at 32 W         Relevant Orders    TSH with reflex to Free T4 if abnormal    Obesity (BMI 30-39.9)    Overview     EFW 30 42%  , 36W          Other Visit Diagnoses       Multigravida in third trimester ([facility])

## 2024-12-30 DIAGNOSIS — E03.8 OTHER SPECIFIED HYPOTHYROIDISM: Primary | ICD-10-CM

## 2024-12-30 RX ORDER — LEVOTHYROXINE SODIUM 25 UG/1
25 TABLET ORAL DAILY
Qty: 90 TABLET | Refills: 0 | Status: SHIPPED | OUTPATIENT
Start: 2024-12-30

## 2025-01-02 ENCOUNTER — APPOINTMENT (OUTPATIENT)
Dept: OBSTETRICS AND GYNECOLOGY | Facility: CLINIC | Age: 35
End: 2025-01-02
Payer: COMMERCIAL

## 2025-01-02 VITALS — DIASTOLIC BLOOD PRESSURE: 60 MMHG | BODY MASS INDEX: 32.88 KG/M2 | SYSTOLIC BLOOD PRESSURE: 106 MMHG | WEIGHT: 174 LBS

## 2025-01-02 DIAGNOSIS — Z86.32 HISTORY OF DIET CONTROLLED GESTATIONAL DIABETES MELLITUS (GDM): Primary | ICD-10-CM

## 2025-01-02 DIAGNOSIS — E66.9 OBESITY (BMI 30-39.9): ICD-10-CM

## 2025-01-02 DIAGNOSIS — E03.8 OTHER SPECIFIED HYPOTHYROIDISM: ICD-10-CM

## 2025-01-02 PROCEDURE — 0501F PRENATAL FLOW SHEET: CPT | Performed by: OBSTETRICS & GYNECOLOGY

## 2025-01-02 PROCEDURE — 59025 FETAL NON-STRESS TEST: CPT | Performed by: OBSTETRICS & GYNECOLOGY

## 2025-01-02 NOTE — PROCEDURES
Almaz Bates, a  at 33w5d with an KYLEE of 2/15/2025, by Last Menstrual Period, was seen at [facility] for a nonstress test.    Non-Stress Test   Baseline Fetal Heart Rate for Non-Stress Test: 150 BPM  Variability in Waveform for Non-Stress Test: Moderate  Accelerations in Non-Stress Test: Yes, greater than/equal to 15 bpm, lasting at least 15 seconds  Decelerations in Non-Stress Test: None  Interpretation of Non-Stress Test   Interpretation of Non-Stress Test: Reactive

## 2025-01-02 NOTE — PROGRESS NOTES
Problem List Items Addressed This Visit       History of diet controlled gestational diabetes mellitus (GDM) - Primary    Overview     1 hour GTT at 25 weeks normal         Other specified hypothyroidism    Overview     TSH q. Trimester - do with 50gr  Synthroid   testing 34 weeks  Check TSH at 32 W         Obesity (BMI 30-39.9)    Overview     EFW 30 42%  , 36W

## 2025-01-03 ENCOUNTER — APPOINTMENT (OUTPATIENT)
Dept: OBSTETRICS AND GYNECOLOGY | Facility: CLINIC | Age: 35
End: 2025-01-03
Payer: COMMERCIAL

## 2025-01-16 ENCOUNTER — APPOINTMENT (OUTPATIENT)
Dept: OBSTETRICS AND GYNECOLOGY | Facility: CLINIC | Age: 35
End: 2025-01-16
Payer: COMMERCIAL

## 2025-01-16 ENCOUNTER — APPOINTMENT (OUTPATIENT)
Dept: RADIOLOGY | Facility: CLINIC | Age: 35
End: 2025-01-16
Payer: COMMERCIAL

## 2025-01-16 VITALS — DIASTOLIC BLOOD PRESSURE: 62 MMHG | WEIGHT: 177 LBS | BODY MASS INDEX: 33.44 KG/M2 | SYSTOLIC BLOOD PRESSURE: 110 MMHG

## 2025-01-16 DIAGNOSIS — E03.8 OTHER SPECIFIED HYPOTHYROIDISM: Primary | ICD-10-CM

## 2025-01-16 DIAGNOSIS — E66.9 OBESITY (BMI 30-39.9): ICD-10-CM

## 2025-01-16 DIAGNOSIS — Z34.83 MULTIGRAVIDA IN THIRD TRIMESTER (HHS-HCC): ICD-10-CM

## 2025-01-16 DIAGNOSIS — E03.8 OTHER SPECIFIED HYPOTHYROIDISM: ICD-10-CM

## 2025-01-16 PROBLEM — Z86.32 HISTORY OF DIET CONTROLLED GESTATIONAL DIABETES MELLITUS (GDM): Status: RESOLVED | Noted: 2024-07-22 | Resolved: 2025-01-16

## 2025-01-16 PROCEDURE — 76815 OB US LIMITED FETUS(S): CPT | Performed by: OBSTETRICS & GYNECOLOGY

## 2025-01-16 PROCEDURE — 76819 FETAL BIOPHYS PROFIL W/O NST: CPT | Performed by: OBSTETRICS & GYNECOLOGY

## 2025-01-16 PROCEDURE — 76818 FETAL BIOPHYS PROFILE W/NST: CPT | Performed by: OBSTETRICS & GYNECOLOGY

## 2025-01-16 PROCEDURE — 0501F PRENATAL FLOW SHEET: CPT | Performed by: OBSTETRICS & GYNECOLOGY

## 2025-01-16 PROCEDURE — 87081 CULTURE SCREEN ONLY: CPT

## 2025-01-16 NOTE — PROGRESS NOTES
Problem List Items Addressed This Visit       Other specified hypothyroidism - Primary    Overview     TSH q. Trimester - do with 50gr  Synthroid   testing 34 weeks  Check TSH at 32 W         Obesity (BMI 30-39.9)    Overview     EFW 30 42%  , 36W          Other Visit Diagnoses       Multigravida in third trimester ([facility])        Relevant Orders    Group B Streptococcus (GBS) Prenatal Screen, Culture

## 2025-01-16 NOTE — PROCEDURES
Almaz Spainmatthewnafisa, a  at 35w5d with an KYLEE of 2/15/2025, by Last Menstrual Period, was seen at [facility] for a biophysical profile with nonstress test.            Biophysical Profile  Fetal Breathin  Fetal Movement: 2  Fetal Tone: 2  Fluid Volume: 2  Biophysical Profile Score (of 8): 8 /8

## 2025-01-18 LAB — GP B STREP GENITAL QL CULT: NORMAL

## 2025-01-23 ENCOUNTER — APPOINTMENT (OUTPATIENT)
Dept: OBSTETRICS AND GYNECOLOGY | Facility: CLINIC | Age: 35
End: 2025-01-23
Payer: COMMERCIAL

## 2025-01-23 VITALS — WEIGHT: 179 LBS | BODY MASS INDEX: 33.82 KG/M2 | DIASTOLIC BLOOD PRESSURE: 68 MMHG | SYSTOLIC BLOOD PRESSURE: 110 MMHG

## 2025-01-23 DIAGNOSIS — E66.9 OBESITY (BMI 30-39.9): ICD-10-CM

## 2025-01-23 DIAGNOSIS — Z34.83 MULTIGRAVIDA IN THIRD TRIMESTER (HHS-HCC): ICD-10-CM

## 2025-01-23 DIAGNOSIS — E03.8 OTHER SPECIFIED HYPOTHYROIDISM: Primary | ICD-10-CM

## 2025-01-23 PROCEDURE — 0501F PRENATAL FLOW SHEET: CPT | Performed by: OBSTETRICS & GYNECOLOGY

## 2025-01-23 PROCEDURE — 59025 FETAL NON-STRESS TEST: CPT | Performed by: OBSTETRICS & GYNECOLOGY

## 2025-01-27 NOTE — PROCEDURES
Almaz Anishnafisa, a  at 37w2d with an KYLEE of 2/15/2025, by Last Menstrual Period, was seen at [facility] for a nonstress test.    Non-Stress Test   Baseline Fetal Heart Rate for Non-Stress Test: 140 BPM  Variability in Waveform for Non-Stress Test: Moderate  Accelerations in Non-Stress Test: Yes, lasting at least 15 seconds, greater than/equal to 15 bpm  Decelerations in Non-Stress Test: None  Interpretation of Non-Stress Test   Interpretation of Non-Stress Test: Reactive

## 2025-01-27 NOTE — PROGRESS NOTES
Problem List Items Addressed This Visit       Other specified hypothyroidism - Primary    Overview     TSH q. Trimester - do with 50gr  Synthroid   testing 34 weeks  Check TSH at 32 W         Obesity (BMI 30-39.9)    Overview     EFW 30 42%  , 36W          Other Visit Diagnoses       Multigravida in third trimester ([facility])

## 2025-01-30 ENCOUNTER — APPOINTMENT (OUTPATIENT)
Dept: OBSTETRICS AND GYNECOLOGY | Facility: CLINIC | Age: 35
End: 2025-01-30
Payer: COMMERCIAL

## 2025-01-30 VITALS — BODY MASS INDEX: 34.2 KG/M2 | DIASTOLIC BLOOD PRESSURE: 70 MMHG | SYSTOLIC BLOOD PRESSURE: 108 MMHG | WEIGHT: 181 LBS

## 2025-01-30 DIAGNOSIS — E03.8 OTHER SPECIFIED HYPOTHYROIDISM: Primary | ICD-10-CM

## 2025-01-30 DIAGNOSIS — Z34.83 MULTIGRAVIDA IN THIRD TRIMESTER (HHS-HCC): ICD-10-CM

## 2025-01-30 DIAGNOSIS — Z31.83 IN VITRO FERTILIZATION: ICD-10-CM

## 2025-01-30 DIAGNOSIS — E66.9 OBESITY (BMI 30-39.9): ICD-10-CM

## 2025-01-30 PROCEDURE — 0501F PRENATAL FLOW SHEET: CPT | Performed by: OBSTETRICS & GYNECOLOGY

## 2025-01-30 PROCEDURE — 59025 FETAL NON-STRESS TEST: CPT | Performed by: OBSTETRICS & GYNECOLOGY

## 2025-01-30 NOTE — PROCEDURES
Almaz Racznafisa, a  at 37w5d with an KYLEE of 2/15/2025, by Last Menstrual Period, was seen at [facility] for a nonstress test.    Non-Stress Test   Baseline Fetal Heart Rate for Non-Stress Test: 145 BPM  Variability in Waveform for Non-Stress Test: Moderate  Accelerations in Non-Stress Test: Yes, lasting at least 15 seconds, greater than/equal to 15 bpm  Decelerations in Non-Stress Test: None  Interpretation of Non-Stress Test   Interpretation of Non-Stress Test: Reactive

## 2025-01-30 NOTE — PROGRESS NOTES
Problem List Items Addressed This Visit       In vitro fertilization    Other specified hypothyroidism - Primary    Overview     TSH q. Trimester - do with 50gr  Synthroid   testing 34 weeks  Check TSH at 32 W         Obesity (BMI 30-39.9)    Overview     EFW 30 42%  , 36W          Other Visit Diagnoses       Multigravida in third trimester ([facility])

## 2025-02-06 ENCOUNTER — APPOINTMENT (OUTPATIENT)
Dept: OBSTETRICS AND GYNECOLOGY | Facility: CLINIC | Age: 35
End: 2025-02-06
Payer: COMMERCIAL

## 2025-02-06 ENCOUNTER — HOSPITAL ENCOUNTER (OUTPATIENT)
Dept: RADIOLOGY | Facility: CLINIC | Age: 35
Discharge: HOME | End: 2025-02-06
Payer: COMMERCIAL

## 2025-02-06 VITALS — DIASTOLIC BLOOD PRESSURE: 70 MMHG | SYSTOLIC BLOOD PRESSURE: 112 MMHG | WEIGHT: 182 LBS | BODY MASS INDEX: 34.39 KG/M2

## 2025-02-06 DIAGNOSIS — E66.9 OBESITY (BMI 30-39.9): ICD-10-CM

## 2025-02-06 DIAGNOSIS — E03.8 OTHER SPECIFIED HYPOTHYROIDISM: Primary | ICD-10-CM

## 2025-02-06 DIAGNOSIS — Z34.83 MULTIGRAVIDA IN THIRD TRIMESTER (HHS-HCC): ICD-10-CM

## 2025-02-06 DIAGNOSIS — E03.8 OTHER SPECIFIED HYPOTHYROIDISM: ICD-10-CM

## 2025-02-06 DIAGNOSIS — O28.8 NST (NON-STRESS TEST) NONREACTIVE: ICD-10-CM

## 2025-02-06 PROCEDURE — 0501F PRENATAL FLOW SHEET: CPT | Performed by: OBSTETRICS & GYNECOLOGY

## 2025-02-06 PROCEDURE — 59025 FETAL NON-STRESS TEST: CPT | Performed by: OBSTETRICS & GYNECOLOGY

## 2025-02-06 NOTE — PROCEDURES
Almaz Bates, a  at 38w5d with an KYLEE of 2/15/2025, by Last Menstrual Period, was seen at [facility] for a biophysical profile with nonstress test.    Non-Stress Test   Baseline Fetal Heart Rate for Non-Stress Test: 155 BPM  Variability in Waveform for Non-Stress Test: Moderate  Accelerations in Non-Stress Test: greater than/equal to 10 bpm, lasting at least 10 seconds  Decelerations in Non-Stress Test: None  Interpretation of Non-Stress Test   Interpretation of Non-Stress Test: (!) Non-reactive    Biophysical Profile  Fetal Breathin  Fetal Movement: 2  Fetal Tone: 2  Fluid Volume: 2  Biophysical Profile Score (of 8):

## 2025-02-06 NOTE — PROCEDURES
Almaz Spainmatthewnafisa, a  at 38w5d with an KYLEE of 2/15/2025, by Last Menstrual Period, was seen at [facility] for a nonstress test.    Non-Stress Test   Baseline Fetal Heart Rate for Non-Stress Test: 155 BPM  Variability in Waveform for Non-Stress Test: Moderate  Accelerations in Non-Stress Test: greater than/equal to 10 bpm, lasting at least 10 seconds  Decelerations in Non-Stress Test: None  Interpretation of Non-Stress Test   Interpretation of Non-Stress Test: (!) Non-reactive

## 2025-02-06 NOTE — PROGRESS NOTES
NRNST - for BPP today  Membranes stripped    Problem List Items Addressed This Visit       Other specified hypothyroidism - Primary    Overview     TSH q. Trimester - do with 50gr  Synthroid   testing 34 weeks  Check TSH at 32 W         Relevant Orders    US fetal biophysical profile w non stress test    Obesity (BMI 30-39.9)    Overview     EFW 30 42%  , 36W          Other Visit Diagnoses       Multigravida in third trimester (HHS-HCC)        Relevant Orders    US fetal biophysical profile w non stress test    NST (non-stress test) nonreactive        Relevant Orders    US fetal biophysical profile w non stress test

## 2025-02-06 NOTE — PROGRESS NOTES
Bpp 8/8   D/w pt pt scheduling induction, labor precautions  Will schedule follow up next week with nst

## 2025-02-10 PROBLEM — L98.9 INFLAMMATORY DERMATOSIS: Status: ACTIVE | Noted: 2024-01-31

## 2025-02-10 PROBLEM — R10.13 EPIGASTRIC PAIN: Status: ACTIVE | Noted: 2024-01-31

## 2025-02-11 ENCOUNTER — APPOINTMENT (OUTPATIENT)
Dept: OBSTETRICS AND GYNECOLOGY | Facility: CLINIC | Age: 35
End: 2025-02-11
Payer: COMMERCIAL

## 2025-02-11 ENCOUNTER — HOSPITAL ENCOUNTER (INPATIENT)
Facility: HOSPITAL | Age: 35
LOS: 2 days | Discharge: ANOTHER HOSPITAL | End: 2025-02-13
Attending: OBSTETRICS & GYNECOLOGY | Admitting: OBSTETRICS & GYNECOLOGY
Payer: COMMERCIAL

## 2025-02-11 VITALS — WEIGHT: 180 LBS | BODY MASS INDEX: 34.01 KG/M2

## 2025-02-11 DIAGNOSIS — E03.8 OTHER SPECIFIED HYPOTHYROIDISM: ICD-10-CM

## 2025-02-11 DIAGNOSIS — E66.9 OBESITY (BMI 30-39.9): ICD-10-CM

## 2025-02-11 DIAGNOSIS — Z34.83 MULTIGRAVIDA IN THIRD TRIMESTER (HHS-HCC): ICD-10-CM

## 2025-02-11 DIAGNOSIS — Z31.83 IN VITRO FERTILIZATION: Primary | ICD-10-CM

## 2025-02-11 PROBLEM — Z37.9 NORMAL LABOR (HHS-HCC): Status: ACTIVE | Noted: 2025-02-11

## 2025-02-11 PROCEDURE — 59025 FETAL NON-STRESS TEST: CPT | Performed by: OBSTETRICS & GYNECOLOGY

## 2025-02-11 PROCEDURE — 1220000001 HC OB SEMI-PRIVATE ROOM DAILY

## 2025-02-11 PROCEDURE — 0501F PRENATAL FLOW SHEET: CPT | Performed by: OBSTETRICS & GYNECOLOGY

## 2025-02-11 RX ORDER — LEVOTHYROXINE SODIUM 25 UG/1
25 TABLET ORAL DAILY
Status: DISCONTINUED | OUTPATIENT
Start: 2025-02-12 | End: 2025-02-12

## 2025-02-11 RX ORDER — METHYLERGONOVINE MALEATE 0.2 MG/ML
0.2 INJECTION INTRAVENOUS ONCE AS NEEDED
Status: DISCONTINUED | OUTPATIENT
Start: 2025-02-11 | End: 2025-02-12

## 2025-02-11 RX ORDER — MISOPROSTOL 200 UG/1
800 TABLET ORAL ONCE AS NEEDED
Status: DISCONTINUED | OUTPATIENT
Start: 2025-02-11 | End: 2025-02-12

## 2025-02-11 RX ORDER — NIFEDIPINE 10 MG/1
10 CAPSULE ORAL ONCE AS NEEDED
Status: DISCONTINUED | OUTPATIENT
Start: 2025-02-11 | End: 2025-02-12

## 2025-02-11 RX ORDER — TRANEXAMIC ACID 100 MG/ML
1000 INJECTION, SOLUTION INTRAVENOUS ONCE AS NEEDED
Status: DISCONTINUED | OUTPATIENT
Start: 2025-02-11 | End: 2025-02-12

## 2025-02-11 RX ORDER — OXYTOCIN 10 [USP'U]/ML
10 INJECTION, SOLUTION INTRAMUSCULAR; INTRAVENOUS ONCE AS NEEDED
Status: COMPLETED | OUTPATIENT
Start: 2025-02-11 | End: 2025-02-12

## 2025-02-11 RX ORDER — LIDOCAINE HYDROCHLORIDE 10 MG/ML
30 INJECTION, SOLUTION INFILTRATION; PERINEURAL ONCE AS NEEDED
Status: DISCONTINUED | OUTPATIENT
Start: 2025-02-11 | End: 2025-02-12

## 2025-02-11 RX ORDER — LABETALOL HYDROCHLORIDE 5 MG/ML
20 INJECTION, SOLUTION INTRAVENOUS ONCE AS NEEDED
Status: DISCONTINUED | OUTPATIENT
Start: 2025-02-11 | End: 2025-02-12

## 2025-02-11 RX ORDER — HYDRALAZINE HYDROCHLORIDE 20 MG/ML
5 INJECTION INTRAMUSCULAR; INTRAVENOUS ONCE AS NEEDED
Status: DISCONTINUED | OUTPATIENT
Start: 2025-02-11 | End: 2025-02-12

## 2025-02-11 RX ORDER — ONDANSETRON 4 MG/1
4 TABLET, FILM COATED ORAL EVERY 6 HOURS PRN
Status: DISCONTINUED | OUTPATIENT
Start: 2025-02-11 | End: 2025-02-12

## 2025-02-11 RX ORDER — OXYTOCIN/0.9 % SODIUM CHLORIDE 30/500 ML
60 PLASTIC BAG, INJECTION (ML) INTRAVENOUS ONCE AS NEEDED
Status: DISCONTINUED | OUTPATIENT
Start: 2025-02-11 | End: 2025-02-12

## 2025-02-11 RX ORDER — ONDANSETRON HYDROCHLORIDE 2 MG/ML
4 INJECTION, SOLUTION INTRAVENOUS EVERY 6 HOURS PRN
Status: DISCONTINUED | OUTPATIENT
Start: 2025-02-11 | End: 2025-02-12

## 2025-02-11 RX ORDER — OXYTOCIN 10 [USP'U]/ML
10 INJECTION, SOLUTION INTRAMUSCULAR; INTRAVENOUS ONCE AS NEEDED
Status: DISCONTINUED | OUTPATIENT
Start: 2025-02-11 | End: 2025-02-12

## 2025-02-11 RX ORDER — LOPERAMIDE HYDROCHLORIDE 2 MG/1
4 CAPSULE ORAL EVERY 2 HOUR PRN
Status: DISCONTINUED | OUTPATIENT
Start: 2025-02-11 | End: 2025-02-12

## 2025-02-11 RX ORDER — SODIUM CHLORIDE, SODIUM LACTATE, POTASSIUM CHLORIDE, CALCIUM CHLORIDE 600; 310; 30; 20 MG/100ML; MG/100ML; MG/100ML; MG/100ML
75 INJECTION, SOLUTION INTRAVENOUS CONTINUOUS
Status: DISCONTINUED | OUTPATIENT
Start: 2025-02-12 | End: 2025-02-12

## 2025-02-11 RX ORDER — TERBUTALINE SULFATE 1 MG/ML
0.25 INJECTION SUBCUTANEOUS ONCE AS NEEDED
Status: DISCONTINUED | OUTPATIENT
Start: 2025-02-11 | End: 2025-02-12

## 2025-02-11 RX ORDER — CARBOPROST TROMETHAMINE 250 UG/ML
250 INJECTION, SOLUTION INTRAMUSCULAR ONCE AS NEEDED
Status: DISCONTINUED | OUTPATIENT
Start: 2025-02-11 | End: 2025-02-12

## 2025-02-11 SDOH — HEALTH STABILITY: MENTAL HEALTH: HAVE YOU USED ANY PRESCRIPTION DRUGS OTHER THAN PRESCRIBED IN THE PAST 12 MONTHS?: NO

## 2025-02-11 SDOH — SOCIAL STABILITY: SOCIAL INSECURITY: DOES ANYONE TRY TO KEEP YOU FROM HAVING/CONTACTING OTHER FRIENDS OR DOING THINGS OUTSIDE YOUR HOME?: NO

## 2025-02-11 SDOH — SOCIAL STABILITY: SOCIAL INSECURITY: HAVE YOU HAD THOUGHTS OF HARMING ANYONE ELSE?: NO

## 2025-02-11 SDOH — SOCIAL STABILITY: SOCIAL INSECURITY: WITHIN THE LAST YEAR, HAVE YOU BEEN HUMILIATED OR EMOTIONALLY ABUSED IN OTHER WAYS BY YOUR PARTNER OR EX-PARTNER?: NO

## 2025-02-11 SDOH — SOCIAL STABILITY: SOCIAL INSECURITY
WITHIN THE LAST YEAR, HAVE YOU BEEN KICKED, HIT, SLAPPED, OR OTHERWISE PHYSICALLY HURT BY YOUR PARTNER OR EX-PARTNER?: NO

## 2025-02-11 SDOH — HEALTH STABILITY: MENTAL HEALTH: NON-SPECIFIC ACTIVE SUICIDAL THOUGHTS (PAST 1 MONTH): NO

## 2025-02-11 SDOH — HEALTH STABILITY: MENTAL HEALTH: HAVE YOU USED ANY SUBSTANCES (CANABIS, COCAINE, HEROIN, HALLUCINOGENS, INHALANTS, ETC.) IN THE PAST 12 MONTHS?: NO

## 2025-02-11 SDOH — SOCIAL STABILITY: SOCIAL INSECURITY
WITHIN THE LAST YEAR, HAVE YOU BEEN RAPED OR FORCED TO HAVE ANY KIND OF SEXUAL ACTIVITY BY YOUR PARTNER OR EX-PARTNER?: NO

## 2025-02-11 SDOH — SOCIAL STABILITY: SOCIAL INSECURITY: WITHIN THE LAST YEAR, HAVE YOU BEEN AFRAID OF YOUR PARTNER OR EX-PARTNER?: NO

## 2025-02-11 SDOH — ECONOMIC STABILITY: FOOD INSECURITY: WITHIN THE PAST 12 MONTHS, THE FOOD YOU BOUGHT JUST DIDN'T LAST AND YOU DIDN'T HAVE MONEY TO GET MORE.: NEVER TRUE

## 2025-02-11 SDOH — ECONOMIC STABILITY: FOOD INSECURITY: WITHIN THE PAST 12 MONTHS, YOU WORRIED THAT YOUR FOOD WOULD RUN OUT BEFORE YOU GOT THE MONEY TO BUY MORE.: NEVER TRUE

## 2025-02-11 SDOH — SOCIAL STABILITY: SOCIAL INSECURITY: ABUSE SCREEN: ADULT

## 2025-02-11 SDOH — SOCIAL STABILITY: SOCIAL INSECURITY: DO YOU FEEL ANYONE HAS EXPLOITED OR TAKEN ADVANTAGE OF YOU FINANCIALLY OR OF YOUR PERSONAL PROPERTY?: NO

## 2025-02-11 SDOH — SOCIAL STABILITY: SOCIAL INSECURITY: HAVE YOU HAD ANY THOUGHTS OF HARMING ANYONE ELSE?: NO

## 2025-02-11 SDOH — SOCIAL STABILITY: SOCIAL INSECURITY: VERBAL ABUSE: DENIES

## 2025-02-11 SDOH — SOCIAL STABILITY: SOCIAL INSECURITY: ARE YOU OR HAVE YOU BEEN THREATENED OR ABUSED PHYSICALLY, EMOTIONALLY, OR SEXUALLY BY ANYONE?: NO

## 2025-02-11 SDOH — ECONOMIC STABILITY: HOUSING INSECURITY: DO YOU FEEL UNSAFE GOING BACK TO THE PLACE WHERE YOU ARE LIVING?: NO

## 2025-02-11 SDOH — HEALTH STABILITY: MENTAL HEALTH: SUICIDAL BEHAVIOR (LIFETIME): NO

## 2025-02-11 SDOH — SOCIAL STABILITY: SOCIAL INSECURITY: HAS ANYONE EVER THREATENED TO HURT YOUR FAMILY OR YOUR PETS?: NO

## 2025-02-11 SDOH — HEALTH STABILITY: MENTAL HEALTH: WERE YOU ABLE TO COMPLETE ALL THE BEHAVIORAL HEALTH SCREENINGS?: YES

## 2025-02-11 SDOH — SOCIAL STABILITY: SOCIAL INSECURITY: PHYSICAL ABUSE: DENIES

## 2025-02-11 SDOH — SOCIAL STABILITY: SOCIAL INSECURITY: ARE THERE ANY APPARENT SIGNS OF INJURIES/BEHAVIORS THAT COULD BE RELATED TO ABUSE/NEGLECT?: NO

## 2025-02-11 SDOH — HEALTH STABILITY: MENTAL HEALTH: WISH TO BE DEAD (PAST 1 MONTH): NO

## 2025-02-11 ASSESSMENT — ACTIVITIES OF DAILY LIVING (ADL)
LACK_OF_TRANSPORTATION: NO
LACK_OF_TRANSPORTATION: NO

## 2025-02-11 ASSESSMENT — LIFESTYLE VARIABLES
HOW OFTEN DO YOU HAVE 6 OR MORE DRINKS ON ONE OCCASION: NEVER
AUDIT-C TOTAL SCORE: 0
AUDIT-C TOTAL SCORE: 0
SKIP TO QUESTIONS 9-10: 1
HOW MANY STANDARD DRINKS CONTAINING ALCOHOL DO YOU HAVE ON A TYPICAL DAY: PATIENT DOES NOT DRINK
HOW OFTEN DO YOU HAVE A DRINK CONTAINING ALCOHOL: NEVER

## 2025-02-11 ASSESSMENT — PATIENT HEALTH QUESTIONNAIRE - PHQ9
2. FEELING DOWN, DEPRESSED OR HOPELESS: NOT AT ALL
1. LITTLE INTEREST OR PLEASURE IN DOING THINGS: NOT AT ALL
SUM OF ALL RESPONSES TO PHQ9 QUESTIONS 1 & 2: 0

## 2025-02-11 ASSESSMENT — PAIN SCALES - GENERAL: PAINLEVEL_OUTOF10: 4

## 2025-02-11 NOTE — PROCEDURES
Almaz Racznafisa, a  at 39w3d with an KYLEE of 2/15/2025, by Last Menstrual Period, was seen at [facility] for a nonstress test.    Non-Stress Test   Baseline Fetal Heart Rate for Non-Stress Test: 140 BPM  Variability in Waveform for Non-Stress Test: Moderate  Accelerations in Non-Stress Test: greater than/equal to 15 bpm, lasting at least 15 seconds  Decelerations in Non-Stress Test: None  Interpretation of Non-Stress Test   Interpretation of Non-Stress Test: Reactive

## 2025-02-11 NOTE — PROGRESS NOTES
Problem List Items Addressed This Visit       In vitro fertilization - Primary    Overview     Ind          Other specified hypothyroidism    Overview     TSH q. Trimester - do with 50gr  Synthroid   testing 34 weeks  Check TSH at 32 W         Obesity (BMI 30-39.9)    Overview     EFW 30 42%  , 36W          Other Visit Diagnoses       Multigravida in third trimester (HHS-HCC)        Relevant Orders    Labor Induction            
None

## 2025-02-12 ENCOUNTER — ANESTHESIA (OUTPATIENT)
Dept: OBSTETRICS AND GYNECOLOGY | Facility: HOSPITAL | Age: 35
End: 2025-02-12
Payer: COMMERCIAL

## 2025-02-12 ENCOUNTER — ANESTHESIA EVENT (OUTPATIENT)
Dept: OBSTETRICS AND GYNECOLOGY | Facility: HOSPITAL | Age: 35
End: 2025-02-12
Payer: COMMERCIAL

## 2025-02-12 LAB
ABO GROUP (TYPE) IN BLOOD: NORMAL
ANTIBODY SCREEN: NORMAL
ERYTHROCYTE [DISTWIDTH] IN BLOOD BY AUTOMATED COUNT: 13 % (ref 11.5–14.5)
HCT VFR BLD AUTO: 34.9 % (ref 36–46)
HGB BLD-MCNC: 12.3 G/DL (ref 12–16)
MCH RBC QN AUTO: 32.2 PG (ref 26–34)
MCHC RBC AUTO-ENTMCNC: 35.2 G/DL (ref 32–36)
MCV RBC AUTO: 91 FL (ref 80–100)
NRBC BLD-RTO: 0 /100 WBCS (ref 0–0)
PLATELET # BLD AUTO: 197 X10*3/UL (ref 150–450)
RBC # BLD AUTO: 3.82 X10*6/UL (ref 4–5.2)
RH FACTOR (ANTIGEN D): NORMAL
TREPONEMA PALLIDUM IGG+IGM AB [PRESENCE] IN SERUM OR PLASMA BY IMMUNOASSAY: NONREACTIVE
WBC # BLD AUTO: 13.5 X10*3/UL (ref 4.4–11.3)

## 2025-02-12 PROCEDURE — 2500000001 HC RX 250 WO HCPCS SELF ADMINISTERED DRUGS (ALT 637 FOR MEDICARE OP): Performed by: OBSTETRICS & GYNECOLOGY

## 2025-02-12 PROCEDURE — 59409 OBSTETRICAL CARE: CPT | Performed by: OBSTETRICS & GYNECOLOGY

## 2025-02-12 PROCEDURE — 86780 TREPONEMA PALLIDUM: CPT | Mod: AHULAB | Performed by: ADVANCED PRACTICE MIDWIFE

## 2025-02-12 PROCEDURE — 36415 COLL VENOUS BLD VENIPUNCTURE: CPT | Performed by: ADVANCED PRACTICE MIDWIFE

## 2025-02-12 PROCEDURE — 2500000004 HC RX 250 GENERAL PHARMACY W/ HCPCS (ALT 636 FOR OP/ED): Performed by: ADVANCED PRACTICE MIDWIFE

## 2025-02-12 PROCEDURE — 86901 BLOOD TYPING SEROLOGIC RH(D): CPT | Performed by: ADVANCED PRACTICE MIDWIFE

## 2025-02-12 PROCEDURE — 1100000001 HC PRIVATE ROOM DAILY

## 2025-02-12 PROCEDURE — 85027 COMPLETE CBC AUTOMATED: CPT | Performed by: ADVANCED PRACTICE MIDWIFE

## 2025-02-12 PROCEDURE — 59050 FETAL MONITOR W/REPORT: CPT

## 2025-02-12 PROCEDURE — 0HQ9XZZ REPAIR PERINEUM SKIN, EXTERNAL APPROACH: ICD-10-PCS | Performed by: OBSTETRICS & GYNECOLOGY

## 2025-02-12 PROCEDURE — 7210000002 HC LABOR PER HOUR

## 2025-02-12 PROCEDURE — 01967 NEURAXL LBR ANES VAG DLVR: CPT | Performed by: NURSE ANESTHETIST, CERTIFIED REGISTERED

## 2025-02-12 PROCEDURE — 2500000004 HC RX 250 GENERAL PHARMACY W/ HCPCS (ALT 636 FOR OP/ED): Performed by: NURSE ANESTHETIST, CERTIFIED REGISTERED

## 2025-02-12 PROCEDURE — 3700000014 EPIDURAL BLOCK: Performed by: NURSE ANESTHETIST, CERTIFIED REGISTERED

## 2025-02-12 PROCEDURE — 59400 OBSTETRICAL CARE: CPT | Performed by: OBSTETRICS & GYNECOLOGY

## 2025-02-12 PROCEDURE — 51701 INSERT BLADDER CATHETER: CPT

## 2025-02-12 PROCEDURE — 2500000005 HC RX 250 GENERAL PHARMACY W/O HCPCS: Performed by: OBSTETRICS & GYNECOLOGY

## 2025-02-12 RX ORDER — ACETAMINOPHEN 325 MG/1
975 TABLET ORAL EVERY 6 HOURS
Status: DISCONTINUED | OUTPATIENT
Start: 2025-02-12 | End: 2025-02-13 | Stop reason: HOSPADM

## 2025-02-12 RX ORDER — IBUPROFEN 600 MG/1
600 TABLET ORAL EVERY 6 HOURS
Status: DISCONTINUED | OUTPATIENT
Start: 2025-02-12 | End: 2025-02-13 | Stop reason: HOSPADM

## 2025-02-12 RX ORDER — ADHESIVE BANDAGE
10 BANDAGE TOPICAL
Status: DISCONTINUED | OUTPATIENT
Start: 2025-02-12 | End: 2025-02-13 | Stop reason: HOSPADM

## 2025-02-12 RX ORDER — OXYTOCIN/0.9 % SODIUM CHLORIDE 30/500 ML
60 PLASTIC BAG, INJECTION (ML) INTRAVENOUS ONCE AS NEEDED
Status: DISCONTINUED | OUTPATIENT
Start: 2025-02-12 | End: 2025-02-13 | Stop reason: HOSPADM

## 2025-02-12 RX ORDER — ONDANSETRON 4 MG/1
4 TABLET, FILM COATED ORAL EVERY 6 HOURS PRN
Status: DISCONTINUED | OUTPATIENT
Start: 2025-02-12 | End: 2025-02-13 | Stop reason: HOSPADM

## 2025-02-12 RX ORDER — METHYLERGONOVINE MALEATE 0.2 MG/ML
0.2 INJECTION INTRAVENOUS ONCE AS NEEDED
Status: DISCONTINUED | OUTPATIENT
Start: 2025-02-12 | End: 2025-02-13 | Stop reason: HOSPADM

## 2025-02-12 RX ORDER — FENTANYL/ROPIVACAINE/NS/PF 2MCG/ML-.2
0-25 PLASTIC BAG, INJECTION (ML) INJECTION CONTINUOUS
Status: DISCONTINUED | OUTPATIENT
Start: 2025-02-12 | End: 2025-02-12

## 2025-02-12 RX ORDER — NIFEDIPINE 10 MG/1
10 CAPSULE ORAL ONCE AS NEEDED
Status: DISCONTINUED | OUTPATIENT
Start: 2025-02-12 | End: 2025-02-13 | Stop reason: HOSPADM

## 2025-02-12 RX ORDER — LABETALOL HYDROCHLORIDE 5 MG/ML
20 INJECTION, SOLUTION INTRAVENOUS ONCE AS NEEDED
Status: DISCONTINUED | OUTPATIENT
Start: 2025-02-12 | End: 2025-02-13 | Stop reason: HOSPADM

## 2025-02-12 RX ORDER — SIMETHICONE 80 MG
80 TABLET,CHEWABLE ORAL 4 TIMES DAILY PRN
Status: DISCONTINUED | OUTPATIENT
Start: 2025-02-12 | End: 2025-02-13 | Stop reason: HOSPADM

## 2025-02-12 RX ORDER — LOPERAMIDE HYDROCHLORIDE 2 MG/1
4 CAPSULE ORAL EVERY 2 HOUR PRN
Status: DISCONTINUED | OUTPATIENT
Start: 2025-02-12 | End: 2025-02-13 | Stop reason: HOSPADM

## 2025-02-12 RX ORDER — DIPHENHYDRAMINE HCL 25 MG
25 CAPSULE ORAL EVERY 6 HOURS PRN
Status: DISCONTINUED | OUTPATIENT
Start: 2025-02-12 | End: 2025-02-13 | Stop reason: HOSPADM

## 2025-02-12 RX ORDER — HYDRALAZINE HYDROCHLORIDE 20 MG/ML
5 INJECTION INTRAMUSCULAR; INTRAVENOUS ONCE AS NEEDED
Status: DISCONTINUED | OUTPATIENT
Start: 2025-02-12 | End: 2025-02-13 | Stop reason: HOSPADM

## 2025-02-12 RX ORDER — MISOPROSTOL 200 UG/1
800 TABLET ORAL ONCE AS NEEDED
Status: DISCONTINUED | OUTPATIENT
Start: 2025-02-12 | End: 2025-02-13 | Stop reason: HOSPADM

## 2025-02-12 RX ORDER — ONDANSETRON HYDROCHLORIDE 2 MG/ML
4 INJECTION, SOLUTION INTRAVENOUS EVERY 6 HOURS PRN
Status: DISCONTINUED | OUTPATIENT
Start: 2025-02-12 | End: 2025-02-13 | Stop reason: HOSPADM

## 2025-02-12 RX ORDER — CARBOPROST TROMETHAMINE 250 UG/ML
250 INJECTION, SOLUTION INTRAMUSCULAR ONCE AS NEEDED
Status: DISCONTINUED | OUTPATIENT
Start: 2025-02-12 | End: 2025-02-13 | Stop reason: HOSPADM

## 2025-02-12 RX ORDER — DIPHENHYDRAMINE HYDROCHLORIDE 50 MG/ML
25 INJECTION INTRAMUSCULAR; INTRAVENOUS EVERY 6 HOURS PRN
Status: DISCONTINUED | OUTPATIENT
Start: 2025-02-12 | End: 2025-02-13 | Stop reason: HOSPADM

## 2025-02-12 RX ORDER — OXYTOCIN 10 [USP'U]/ML
10 INJECTION, SOLUTION INTRAMUSCULAR; INTRAVENOUS ONCE AS NEEDED
Status: DISCONTINUED | OUTPATIENT
Start: 2025-02-12 | End: 2025-02-13 | Stop reason: HOSPADM

## 2025-02-12 RX ORDER — TRANEXAMIC ACID 100 MG/ML
1000 INJECTION, SOLUTION INTRAVENOUS ONCE AS NEEDED
Status: DISCONTINUED | OUTPATIENT
Start: 2025-02-12 | End: 2025-02-13 | Stop reason: HOSPADM

## 2025-02-12 RX ORDER — POLYETHYLENE GLYCOL 3350 17 G/17G
17 POWDER, FOR SOLUTION ORAL 2 TIMES DAILY PRN
Status: DISCONTINUED | OUTPATIENT
Start: 2025-02-12 | End: 2025-02-13 | Stop reason: HOSPADM

## 2025-02-12 RX ADMIN — SODIUM CHLORIDE, POTASSIUM CHLORIDE, SODIUM LACTATE AND CALCIUM CHLORIDE 500 ML: 600; 310; 30; 20 INJECTION, SOLUTION INTRAVENOUS at 00:05

## 2025-02-12 RX ADMIN — SODIUM CHLORIDE, POTASSIUM CHLORIDE, SODIUM LACTATE AND CALCIUM CHLORIDE 75 ML/HR: 600; 310; 30; 20 INJECTION, SOLUTION INTRAVENOUS at 01:10

## 2025-02-12 RX ADMIN — ACETAMINOPHEN 975 MG: 325 TABLET ORAL at 20:25

## 2025-02-12 RX ADMIN — IBUPROFEN 600 MG: 600 TABLET, FILM COATED ORAL at 09:00

## 2025-02-12 RX ADMIN — Medication 9 ML/HR: at 00:59

## 2025-02-12 RX ADMIN — Medication 1 EACH: at 09:00

## 2025-02-12 RX ADMIN — ACETAMINOPHEN 975 MG: 325 TABLET ORAL at 09:00

## 2025-02-12 RX ADMIN — ONDANSETRON 4 MG: 2 INJECTION INTRAMUSCULAR; INTRAVENOUS at 00:03

## 2025-02-12 RX ADMIN — ACETAMINOPHEN 975 MG: 325 TABLET ORAL at 14:56

## 2025-02-12 RX ADMIN — IBUPROFEN 600 MG: 600 TABLET, FILM COATED ORAL at 14:56

## 2025-02-12 RX ADMIN — OXYTOCIN 10 UNITS: 10 INJECTION INTRAVENOUS at 05:46

## 2025-02-12 RX ADMIN — IBUPROFEN 600 MG: 600 TABLET, FILM COATED ORAL at 20:25

## 2025-02-12 RX ADMIN — Medication 4 ML: at 00:54

## 2025-02-12 RX ADMIN — BENZOCAINE AND LEVOMENTHOL 1 APPLICATION: 200; 5 SPRAY TOPICAL at 09:00

## 2025-02-12 SDOH — HEALTH STABILITY: MENTAL HEALTH: CURRENT SMOKER: 0

## 2025-02-12 ASSESSMENT — PAIN SCALES - GENERAL
PAINLEVEL_OUTOF10: 2
PAINLEVEL_OUTOF10: 0 - NO PAIN
PAINLEVEL_OUTOF10: 1
PAINLEVEL_OUTOF10: 0 - NO PAIN
PAINLEVEL_OUTOF10: 2

## 2025-02-12 ASSESSMENT — PAIN DESCRIPTION - LOCATION
LOCATION: ABDOMEN
LOCATION: PERINEUM

## 2025-02-12 NOTE — H&P
OB Admission H&P    Assessment/Plan    Almaz Bates is a 34 y.o.  at 39w3d, KYLEE: 2/15/2025, by Last Menstrual Period, who is admitted for Labor.    Plan   -Admit to L&D, consented  -T&S, CBC, and Syphilis  -Epidural at patient request  -Recheck as clinically indicated by maternal or fetal status    Fetal Status  -NST reactive, reassuring   -Presentation cephalic based on vaginal exam  -EFW 7# by leopolds  -GBS neg    Postpartum  Contraception Plan:  none    Pregnancy Problems (from 24 to present)       No problems associated with this episode.            Subjective   Good fetal movement.  Srom at 1020 pm.  Clear AF  Ctx began shortly therafter.  Now q 3-4 min, uncomfortable    Prenatal Provider georgina    OB History    Para Term  AB Living   2 1 1 0 0 1   SAB IAB Ectopic Multiple Live Births   0 0 0 0 1      # Outcome Date GA Lbr Clark/2nd Weight Sex Type Anes PTL Lv   2 Current            1 Term 22 39w0d  2.807 kg F Vag-Spont EPI N JOSE ALFREDO      Name: RADHA       Past Surgical History:   Procedure Laterality Date    EYE SURGERY      cyst removal       Social History     Tobacco Use    Smoking status: Never    Smokeless tobacco: Never   Substance Use Topics    Alcohol use: Not Currently       Allergies   Allergen Reactions    Amoxicillin Hives and Rash       Medications Prior to Admission   Medication Sig Dispense Refill Last Dose/Taking    cholecalciferol (Vitamin D-3) 25 MCG (1000 UT) tablet Take by mouth.   2025    levothyroxine (Synthroid, Levoxyl) 25 mcg tablet Take 1 tablet (25 mcg) by mouth early in the morning.. Take on an empty stomach at the same time each day, either 30 to 60 minutes prior to breakfast 90 tablet 0 2025    prenatal no115/iron/folic acid (PRENATAL 19 ORAL) Take by mouth.   2025     Objective     Last Vitals  Temp Pulse Resp BP MAP O2 Sat   36.7 °C (98.1 °F) 74 16 117/74 90 98 %     Blood Pressures         2025  2334             BP: 117/74              Physical Exam  General: NAD, mood appropriate  Cardiopulmonary: warm and well perfused, breathing comfortably on room air  Abdomen: Gravid, non-tender  Extremities: Symmetric  Speculum Exam: grossly ruptured w clear AF.  Exam 3-4/100/-1/cephalic  Cervix: 3 /100 /-1      Fetal Monitoring  Baseline: 150 bpm, Variability: moderate,  Accelerations: present and Decelerations: none  Uterine Activity: q3-4 minutes  Interpretation: Reactive    Recent TFT's (12/25) normal

## 2025-02-12 NOTE — DISCHARGE SUMMARY
Discharge Summary    Admission Date: 2/11/2025  Discharge Date: 2/12/2025    Discharge Diagnosis  Normal labor (HHS-HCC)    Hospital Course  Delivery Date: 2/12/2025 5:44 AM  Delivery type: Vaginal, Spontaneous   GA at delivery: 39w4d  Outcome: Living  Anesthesia during delivery: Epidural  Intrapartum complications: None  Feeding method: Breastfeeding Status: Yes     Procedures: none  Contraception at discharge: none              Last Vitals:  Temp Pulse Resp BP MAP Pulse Ox   37 °C (98.6 °F) 60 16 106/68 74 98 %     Discharge Meds     Your medication list        ASK your doctor about these medications        Instructions Last Dose Given Next Dose Due   cholecalciferol 25 MCG (1000 UT) tablet  Commonly known as: Vitamin D-3           levothyroxine 25 mcg tablet  Commonly known as: Synthroid, Levoxyl      Take 1 tablet (25 mcg) by mouth early in the morning.. Take on an empty stomach at the same time each day, either 30 to 60 minutes prior to breakfast       PRENATAL 19 ORAL                     Complications Requiring Follow-Up  None    Test Results Pending At Discharge  Pending Labs       No current pending labs.            Outpatient Follow-Up  No future appointments.          Jackelin Fink, DO

## 2025-02-12 NOTE — L&D DELIVERY NOTE
OB Delivery Note  2025  Almaz Bates  34 y.o.   Vaginal, Spontaneous       Gestational Age: 39w4d  /Para:   Quantitative Blood Loss: Admission to Discharge: 0 mL (2025 11:17 PM - 2025  6:12 AM)    Hunter Bates [[id number]]      Labor Events    Rupture date/time: 2025 2220  Rupture type: Spontaneous  Fluid color: Clear  Fluid odor: None  Labor type: Spontaneous Onset of Labor  Labor allowed to proceed with plans for an attempted vaginal birth?: Yes  Augmentation: None  Complications: None       Labor Event Times    Dilation complete date/time: 2025 0531       Placenta    Placenta delivery date/time:   Placenta removal: Spontaneous  Placenta appearance: Intact  Placenta disposition: discarded       Cord    Vessels: 3 vessels  Complications: None  Delayed cord clamping?: Yes  Cord blood disposition: Refrigerator  Gases sent?: No  Stem cell collection (by provider): No       Lacerations    Episiotomy: None  Perineal laceration: 1st  Other lacerations?: No  Repair suture: 3-0 Synthetic Suture       Anesthesia    Method: Epidural       Operative Delivery    Forceps attempted?: No  Vacuum extractor attempted?: No       Shoulder Dystocia    Shoulder dystocia present?: No        Delivery    Birth date/time: 2025 05:44:00  Delivery type: Vaginal, Spontaneous  Complications: None       Resuscitation    Method: Tactile stimulation       Apgars    Living status: Living  Apgar Component Scores:  1 min.:  5 min.:  10 min.:  15 min.:  20 min.:    Skin color:  0  1       Heart rate:  2  2       Reflex irritability:  2  2       Muscle tone:  2  2       Respiratory effort:  2  2       Total:  8  9       Apgars assigned by: EVA DUMONT RN       Delivery Providers    Delivering clinician: Randal Pham MD   Provider Role    Randy Lyons RN Delivery Nurse    Geni Dumont RN Nursery Nurse     Resident               Exam at approximately 0530 found pt to be complete/+2.  2  push second stage and delivered vigorous liveborn female infant over first degree laceration.    Moderate bleeding as placenta , but once placenta delivered w maternal expulsive efforts, bleeding minimal.      Randal Pham MD

## 2025-02-12 NOTE — ANESTHESIA PROCEDURE NOTES
Epidural Block    Patient location during procedure: OB  Start time: 2/12/2025 12:40 AM  End time: 2/12/2025 1:06 AM  Reason for block: labor analgesia  Staffing  Performed: CRNA   Authorized by: TONIA Whitaker    Performed by: TONIA Whitaker    Preanesthetic Checklist  Completed: patient identified, IV checked, site marked, risks and benefits discussed, surgical consent, monitors and equipment checked, pre-op evaluation, timeout performed and sterile techniques followed  Block Timeout  RN/Licensed healthcare professional reads aloud to the Anesthesia provider and entire team: Patient identity, procedure with side and site, patient position, and as applicable the availability of implants/special equipment/special requirements.  Patient on coagulant treatment: no  Timeout performed at: 2/12/2025 12:44 AM  Block Placement  Patient position: sitting  Prep: ChloraPrep  Sterility prep: cap, drape, gloves, gown, hand and mask  Sedation level: no sedation  Patient monitoring: blood pressure, continuous pulse oximetry, EKG and heart rate  Approach: midline  Local numbing: lidocaine 1% to skin and subcutaneous tissues  Vertebral space: lumbar  Lumbar location: L3-L4  Epidural  Loss of resistance technique: saline and air  Guidance: landmark technique        Needle  Needle type: Tuohy   Needle gauge: 17  Needle length: 9 cm  Needle insertion depth: 5 cm  Catheter type: multi-orifice  Catheter size: 19 G  Catheter at skin depth: 10 cm  Catheter securement method: clear occlusive dressing, liquid medical adhesive, stabilization device and surgical tape    Test dose: lidocaine 1.5% with epinephrine 1-to-200,000  Test dose: lidocaine 1.5% with epinephrine 1-to-200,000  Test dose result: no positive test dose    PCEA  Medication concentration used: 0.2% Ropivacaine with 2 mcg/mL Fentanyl  Dose (mL): 5  Lockout (minutes): 30  1-Hour Limit (boluses/hr): 2  Basal Rate: 9        Assessment  Sensory level: T10  bilateral  Block outcome: pain improved  Number of attempts: 1  Events: no positive test dose  Procedure assessment: patient tolerated procedure well with no immediate complications

## 2025-02-12 NOTE — ANESTHESIA PREPROCEDURE EVALUATION
Patient: Almaz Bates    Evaluation Method: In-person visit    Procedure Information    Date: 02/12/25  Procedure: Labor Analgesia         Relevant Problems   Anesthesia (within normal limits)      Cardiac (within normal limits)      Pulmonary (within normal limits)      Neuro (within normal limits)      GI (within normal limits)      /Renal (within normal limits)      Liver (within normal limits)      Endocrine   (+) Other specified hypothyroidism      Hematology (within normal limits)      Musculoskeletal (within normal limits)      HEENT (within normal limits)      ID (within normal limits)      Skin (within normal limits)      GYN (within normal limits)       Clinical information reviewed:   Tobacco  Allergies  Meds  Problems  Med Hx  Surg Hx   Fam Hx          NPO Detail:  No data recorded     OB/Gyn Evaluation    Present Pregnancy    Patient is pregnant now.   Obstetric History                Physical Exam    Airway  Mallampati: II  TM distance: >3 FB  Neck ROM: full     Cardiovascular   Rhythm: regular  Rate: normal     Dental    Pulmonary   Breath sounds clear to auscultation     Abdominal   Abdomen: soft  Bowel sounds: normal           Anesthesia Plan    History of general anesthesia?: no  History of complications of general anesthesia?: no    ASA 2     epidural     The patient is not a current smoker.  Patient was not previously instructed to abstain from smoking on day of procedure.  Education provided regarding risk of obstructive sleep apnea.  intravenous induction   Anesthetic plan and risks discussed with patient.    Plan discussed with CRNA.

## 2025-02-12 NOTE — SIGNIFICANT EVENT
Baby being transferred to NICU. Parents request transfer to [facility] to be with baby.

## 2025-02-13 ENCOUNTER — HOSPITAL ENCOUNTER (OUTPATIENT)
Facility: HOSPITAL | Age: 35
Setting detail: OBSERVATION
LOS: 1 days | Discharge: HOME | End: 2025-02-14
Attending: STUDENT IN AN ORGANIZED HEALTH CARE EDUCATION/TRAINING PROGRAM | Admitting: STUDENT IN AN ORGANIZED HEALTH CARE EDUCATION/TRAINING PROGRAM
Payer: COMMERCIAL

## 2025-02-13 VITALS
WEIGHT: 184.63 LBS | TEMPERATURE: 98.1 F | BODY MASS INDEX: 34.86 KG/M2 | HEIGHT: 61 IN | DIASTOLIC BLOOD PRESSURE: 76 MMHG | OXYGEN SATURATION: 98 % | SYSTOLIC BLOOD PRESSURE: 124 MMHG | HEART RATE: 61 BPM | RESPIRATION RATE: 18 BRPM

## 2025-02-13 DIAGNOSIS — Z37.9 NORMAL LABOR (HHS-HCC): Primary | ICD-10-CM

## 2025-02-13 PROCEDURE — 2500000001 HC RX 250 WO HCPCS SELF ADMINISTERED DRUGS (ALT 637 FOR MEDICARE OP)

## 2025-02-13 PROCEDURE — 2500000004 HC RX 250 GENERAL PHARMACY W/ HCPCS (ALT 636 FOR OP/ED)

## 2025-02-13 PROCEDURE — 1220000001 HC OB SEMI-PRIVATE ROOM DAILY

## 2025-02-13 RX ORDER — LABETALOL HYDROCHLORIDE 5 MG/ML
20 INJECTION, SOLUTION INTRAVENOUS ONCE AS NEEDED
Status: DISCONTINUED | OUTPATIENT
Start: 2025-02-13 | End: 2025-02-14 | Stop reason: HOSPADM

## 2025-02-13 RX ORDER — NIFEDIPINE 10 MG/1
10 CAPSULE ORAL ONCE AS NEEDED
Status: DISCONTINUED | OUTPATIENT
Start: 2025-02-13 | End: 2025-02-14 | Stop reason: HOSPADM

## 2025-02-13 RX ORDER — DIPHENHYDRAMINE HCL 25 MG
25 CAPSULE ORAL EVERY 6 HOURS PRN
Status: DISCONTINUED | OUTPATIENT
Start: 2025-02-13 | End: 2025-02-14 | Stop reason: HOSPADM

## 2025-02-13 RX ORDER — LEVOTHYROXINE SODIUM 25 UG/1
25 TABLET ORAL DAILY
Status: DISCONTINUED | OUTPATIENT
Start: 2025-02-13 | End: 2025-02-14 | Stop reason: HOSPADM

## 2025-02-13 RX ORDER — MISOPROSTOL 200 UG/1
800 TABLET ORAL ONCE AS NEEDED
Status: DISCONTINUED | OUTPATIENT
Start: 2025-02-13 | End: 2025-02-14 | Stop reason: HOSPADM

## 2025-02-13 RX ORDER — SIMETHICONE 80 MG
80 TABLET,CHEWABLE ORAL 4 TIMES DAILY PRN
Status: DISCONTINUED | OUTPATIENT
Start: 2025-02-13 | End: 2025-02-14 | Stop reason: HOSPADM

## 2025-02-13 RX ORDER — METHYLERGONOVINE MALEATE 0.2 MG/ML
0.2 INJECTION INTRAVENOUS ONCE AS NEEDED
Status: DISCONTINUED | OUTPATIENT
Start: 2025-02-13 | End: 2025-02-14 | Stop reason: HOSPADM

## 2025-02-13 RX ORDER — OXYTOCIN 10 [USP'U]/ML
10 INJECTION, SOLUTION INTRAMUSCULAR; INTRAVENOUS ONCE AS NEEDED
Status: DISCONTINUED | OUTPATIENT
Start: 2025-02-13 | End: 2025-02-14 | Stop reason: HOSPADM

## 2025-02-13 RX ORDER — FAMOTIDINE 20 MG/1
20 TABLET, FILM COATED ORAL 2 TIMES DAILY PRN
Status: DISCONTINUED | OUTPATIENT
Start: 2025-02-13 | End: 2025-02-14 | Stop reason: HOSPADM

## 2025-02-13 RX ORDER — POLYETHYLENE GLYCOL 3350 17 G/17G
17 POWDER, FOR SOLUTION ORAL 2 TIMES DAILY PRN
Status: DISCONTINUED | OUTPATIENT
Start: 2025-02-13 | End: 2025-02-14 | Stop reason: HOSPADM

## 2025-02-13 RX ORDER — ACETAMINOPHEN 325 MG/1
975 TABLET ORAL EVERY 6 HOURS
Status: DISCONTINUED | OUTPATIENT
Start: 2025-02-13 | End: 2025-02-14 | Stop reason: HOSPADM

## 2025-02-13 RX ORDER — LOPERAMIDE HYDROCHLORIDE 2 MG/1
4 CAPSULE ORAL EVERY 2 HOUR PRN
Status: DISCONTINUED | OUTPATIENT
Start: 2025-02-13 | End: 2025-02-14 | Stop reason: HOSPADM

## 2025-02-13 RX ORDER — ENOXAPARIN SODIUM 100 MG/ML
40 INJECTION SUBCUTANEOUS EVERY 24 HOURS
Status: DISCONTINUED | OUTPATIENT
Start: 2025-02-13 | End: 2025-02-14 | Stop reason: HOSPADM

## 2025-02-13 RX ORDER — ONDANSETRON HYDROCHLORIDE 2 MG/ML
4 INJECTION, SOLUTION INTRAVENOUS EVERY 6 HOURS PRN
Status: DISCONTINUED | OUTPATIENT
Start: 2025-02-13 | End: 2025-02-14 | Stop reason: HOSPADM

## 2025-02-13 RX ORDER — IBUPROFEN 600 MG/1
600 TABLET ORAL EVERY 6 HOURS
Status: DISCONTINUED | OUTPATIENT
Start: 2025-02-13 | End: 2025-02-14 | Stop reason: HOSPADM

## 2025-02-13 RX ORDER — TRANEXAMIC ACID 100 MG/ML
1000 INJECTION, SOLUTION INTRAVENOUS ONCE AS NEEDED
Status: DISCONTINUED | OUTPATIENT
Start: 2025-02-13 | End: 2025-02-14 | Stop reason: HOSPADM

## 2025-02-13 RX ORDER — ADHESIVE BANDAGE
10 BANDAGE TOPICAL
Status: DISCONTINUED | OUTPATIENT
Start: 2025-02-13 | End: 2025-02-14 | Stop reason: HOSPADM

## 2025-02-13 RX ORDER — ACETAMINOPHEN 325 MG/1
975 TABLET ORAL EVERY 6 HOURS
Qty: 90 TABLET | Refills: 0 | Status: SHIPPED | OUTPATIENT
Start: 2025-02-13

## 2025-02-13 RX ORDER — OXYTOCIN/0.9 % SODIUM CHLORIDE 30/500 ML
60 PLASTIC BAG, INJECTION (ML) INTRAVENOUS ONCE AS NEEDED
Status: DISCONTINUED | OUTPATIENT
Start: 2025-02-13 | End: 2025-02-14 | Stop reason: HOSPADM

## 2025-02-13 RX ORDER — DIPHENHYDRAMINE HYDROCHLORIDE 50 MG/ML
25 INJECTION INTRAMUSCULAR; INTRAVENOUS EVERY 6 HOURS PRN
Status: DISCONTINUED | OUTPATIENT
Start: 2025-02-13 | End: 2025-02-14 | Stop reason: HOSPADM

## 2025-02-13 RX ORDER — ONDANSETRON 4 MG/1
4 TABLET, FILM COATED ORAL EVERY 6 HOURS PRN
Status: DISCONTINUED | OUTPATIENT
Start: 2025-02-13 | End: 2025-02-14 | Stop reason: HOSPADM

## 2025-02-13 RX ORDER — IBUPROFEN 600 MG/1
600 TABLET ORAL EVERY 6 HOURS
Qty: 30 TABLET | Refills: 0 | Status: SHIPPED | OUTPATIENT
Start: 2025-02-13

## 2025-02-13 RX ORDER — HYDRALAZINE HYDROCHLORIDE 20 MG/ML
5 INJECTION INTRAMUSCULAR; INTRAVENOUS ONCE AS NEEDED
Status: DISCONTINUED | OUTPATIENT
Start: 2025-02-13 | End: 2025-02-14 | Stop reason: HOSPADM

## 2025-02-13 RX ORDER — CARBOPROST TROMETHAMINE 250 UG/ML
250 INJECTION, SOLUTION INTRAMUSCULAR ONCE AS NEEDED
Status: DISCONTINUED | OUTPATIENT
Start: 2025-02-13 | End: 2025-02-14 | Stop reason: HOSPADM

## 2025-02-13 RX ADMIN — IBUPROFEN 600 MG: 600 TABLET ORAL at 22:09

## 2025-02-13 RX ADMIN — IBUPROFEN 600 MG: 600 TABLET ORAL at 15:50

## 2025-02-13 RX ADMIN — ACETAMINOPHEN 975 MG: 325 TABLET ORAL at 10:11

## 2025-02-13 RX ADMIN — LEVOTHYROXINE SODIUM 25 MCG: 25 TABLET ORAL at 06:20

## 2025-02-13 RX ADMIN — IBUPROFEN 600 MG: 600 TABLET ORAL at 03:48

## 2025-02-13 RX ADMIN — ACETAMINOPHEN 975 MG: 325 TABLET ORAL at 15:50

## 2025-02-13 RX ADMIN — ACETAMINOPHEN 975 MG: 325 TABLET ORAL at 22:09

## 2025-02-13 RX ADMIN — ENOXAPARIN SODIUM 40 MG: 100 INJECTION SUBCUTANEOUS at 15:50

## 2025-02-13 RX ADMIN — ACETAMINOPHEN 975 MG: 325 TABLET ORAL at 03:48

## 2025-02-13 RX ADMIN — IBUPROFEN 600 MG: 600 TABLET ORAL at 10:11

## 2025-02-13 SDOH — SOCIAL STABILITY: SOCIAL INSECURITY: DO YOU FEEL ANYONE HAS EXPLOITED OR TAKEN ADVANTAGE OF YOU FINANCIALLY OR OF YOUR PERSONAL PROPERTY?: NO

## 2025-02-13 SDOH — SOCIAL STABILITY: SOCIAL INSECURITY: DOES ANYONE TRY TO KEEP YOU FROM HAVING/CONTACTING OTHER FRIENDS OR DOING THINGS OUTSIDE YOUR HOME?: NO

## 2025-02-13 SDOH — SOCIAL STABILITY: SOCIAL INSECURITY: HAVE YOU HAD THOUGHTS OF HARMING ANYONE ELSE?: NO

## 2025-02-13 SDOH — SOCIAL STABILITY: SOCIAL INSECURITY: HAS ANYONE EVER THREATENED TO HURT YOUR FAMILY OR YOUR PETS?: NO

## 2025-02-13 SDOH — SOCIAL STABILITY: SOCIAL INSECURITY: ARE THERE ANY APPARENT SIGNS OF INJURIES/BEHAVIORS THAT COULD BE RELATED TO ABUSE/NEGLECT?: NO

## 2025-02-13 SDOH — SOCIAL STABILITY: SOCIAL INSECURITY: DO YOU FEEL UNSAFE GOING BACK TO THE PLACE WHERE YOU ARE LIVING?: NO

## 2025-02-13 SDOH — SOCIAL STABILITY: SOCIAL INSECURITY: ABUSE: ADULT

## 2025-02-13 SDOH — SOCIAL STABILITY: SOCIAL INSECURITY: WERE YOU ABLE TO COMPLETE ALL THE BEHAVIORAL HEALTH SCREENINGS?: YES

## 2025-02-13 SDOH — SOCIAL STABILITY: SOCIAL INSECURITY: ARE YOU OR HAVE YOU BEEN THREATENED OR ABUSED PHYSICALLY, EMOTIONALLY, OR SEXUALLY BY ANYONE?: NO

## 2025-02-13 SDOH — SOCIAL STABILITY: SOCIAL INSECURITY: HAVE YOU HAD ANY THOUGHTS OF HARMING ANYONE ELSE?: NO

## 2025-02-13 ASSESSMENT — LIFESTYLE VARIABLES
AUDIT-C TOTAL SCORE: 0
HOW OFTEN DO YOU HAVE 6 OR MORE DRINKS ON ONE OCCASION: NEVER
HOW OFTEN DO YOU HAVE A DRINK CONTAINING ALCOHOL: NEVER
SKIP TO QUESTIONS 9-10: 1
HOW MANY STANDARD DRINKS CONTAINING ALCOHOL DO YOU HAVE ON A TYPICAL DAY: PATIENT DOES NOT DRINK
AUDIT-C TOTAL SCORE: 0

## 2025-02-13 ASSESSMENT — PATIENT HEALTH QUESTIONNAIRE - PHQ9
SUM OF ALL RESPONSES TO PHQ9 QUESTIONS 1 & 2: 0
2. FEELING DOWN, DEPRESSED OR HOPELESS: NOT AT ALL
1. LITTLE INTEREST OR PLEASURE IN DOING THINGS: NOT AT ALL

## 2025-02-13 ASSESSMENT — ACTIVITIES OF DAILY LIVING (ADL)
HEARING - LEFT EAR: FUNCTIONAL
WALKS IN HOME: INDEPENDENT
FEEDING YOURSELF: INDEPENDENT
DRESSING YOURSELF: INDEPENDENT
GROOMING: INDEPENDENT
ADEQUATE_TO_COMPLETE_ADL: YES
JUDGMENT_ADEQUATE_SAFELY_COMPLETE_DAILY_ACTIVITIES: YES
HEARING - RIGHT EAR: FUNCTIONAL
PATIENT'S MEMORY ADEQUATE TO SAFELY COMPLETE DAILY ACTIVITIES?: YES
BATHING: INDEPENDENT
LACK_OF_TRANSPORTATION: NO
TOILETING: INDEPENDENT

## 2025-02-13 ASSESSMENT — COGNITIVE AND FUNCTIONAL STATUS - GENERAL
DAILY ACTIVITIY SCORE: 24
MOBILITY SCORE: 24
PATIENT BASELINE BEDBOUND: NO

## 2025-02-13 ASSESSMENT — PAIN DESCRIPTION - DESCRIPTORS
DESCRIPTORS: CRAMPING;ACHING
DESCRIPTORS: CRAMPING
DESCRIPTORS: CRAMPING;DISCOMFORT

## 2025-02-13 ASSESSMENT — COLUMBIA-SUICIDE SEVERITY RATING SCALE - C-SSRS
6. HAVE YOU EVER DONE ANYTHING, STARTED TO DO ANYTHING, OR PREPARED TO DO ANYTHING TO END YOUR LIFE?: NO
1. IN THE PAST MONTH, HAVE YOU WISHED YOU WERE DEAD OR WISHED YOU COULD GO TO SLEEP AND NOT WAKE UP?: NO
2. HAVE YOU ACTUALLY HAD ANY THOUGHTS OF KILLING YOURSELF?: NO

## 2025-02-13 ASSESSMENT — PAIN SCALES - GENERAL
PAINLEVEL_OUTOF10: 1
PAINLEVEL_OUTOF10: 1
PAINLEVEL_OUTOF10: 0 - NO PAIN
PAINLEVEL_OUTOF10: 1
PAINLEVEL_OUTOF10: 0 - NO PAIN

## 2025-02-13 ASSESSMENT — PAIN DESCRIPTION - LOCATION: LOCATION: ABDOMEN

## 2025-02-13 NOTE — PROGRESS NOTES
Postpartum Progress Note    Assessment/Plan   Almaz Bates is a 34 y.o., , who delivered at 39w4d gestation    Now PPD#1 s/p Vaginal, Spontaneous on 2025  - Continue routine postpartum care  - Pain well controlled on po medications  - DVT risk score DVT Score (IF A SCORE IS NOT CALCULATING, MUST SELECT A BMI TO COMPLETE): 5 , ppx with SCDs, ambulation, and lovenox  - RH positive, rhogam not indicated  - Hgb:   Results from last 7 days   Lab Units 25  0002   HEMOGLOBIN g/dL 12.3       Maternal Well-Being  - Vitals stable  - All questions and concerns address    [place] Feeding  - Breastfeeding/pumping encouraged  - Lactation consult prn    Contraception  - none  - Education provided    Dispo  - Anticipate d/c on PPD #2 if meeting all postpartum milestones  - Follow-up in 4-6wks with primary NEETU Almazan-CNP     Assessment & Plan  Labor and delivery indication for care or intervention ([facility])    Pregnancy Problems (from 24 to present)       Problem Noted Diagnosed Resolved    Labor and delivery indication for care or intervention ([facility]) 2025 by Arabella Saldivar MD MPH  No    Priority:  Medium               Subjective     Almaz Bates is PPD#1 s/p vaginal delivery who reports feeling overall well.  No acute events overnight.  Voiding spontaneously, passing flatus.  Pain well controlled on PO meds.  Light lochia. Tolerating diet.  Denies dizziness, lightheadedness, LOC, or uncontrolled bleeding.  Very pleasant, doing well.    Objective   Allergies:   Amoxicillin         Last Vitals:  Temp Pulse Resp BP MAP Pulse Ox   36.8 °C (98.2 °F) 72 18 130/84   98 %     Vitals Min/Max Last 24 Hours:  Temp  Min: 36.6 °C (97.9 °F)  Max: 37.3 °C (99.1 °F)  Pulse  Min: 61  Max: 72  Resp  Min: 18  Max: 18  BP  Min: 119/77  Max: 130/84    Intake/Output:   No intake or output data in the 24 hours ending 25 5104    Physical Exam:  General: examination reveals a well developed,  "well nourished, female, in no acute distress. She is alert and cooperative.  HEENT: external ears normal. Nose normal, no erythema or discharge.  Neck: supple, no significant adenopathy  Lungs: breathing even and unlabored, lungs clear  Cardiac: warm and well perfused, heart rate regular  Fundus: firm and below umbilicus, lochia light  Abdominal: soft, non-tender, non-distended, bowel sounds active  Extremities: no redness or tenderness in the calves or thighs, edema: non-pitting BLE  Neurological: alert, oriented, normal speech, no focal findings or movement disorder noted.  Psychological: awake and alert; oriented to person, place, and time.  Skin: no rashes or lesions    Lab Data:  No results found for: \"WBC\", \"HGB\", \"HCT\", \"PLT\"  "

## 2025-02-13 NOTE — LACTATION NOTE
This note was copied from a baby's chart.  Lactation Consultant Note  Lactation Consultation   Kirsten Enciso RN IBCLC  Recommendations/Summary       I spoke with FOB at pt's bedside to explained availability of Lactation Consult services. Provided written patient education instruction materials on the listed topics: JUSTYN, Benefits of mother's own milk for the infant, breast massage and hand expression,CDC pump cleaning & sanitizing guidelines.

## 2025-02-13 NOTE — H&P
Problem: At Risk for Falls  Goal: Patient does not fall  Outcome: Monitoring/Evaluating progress     Problem: Pain  Goal: Acceptable pain level achieved/maintained at rest using appropriate pain scale for the patient  Outcome: Monitoring/Evaluating progress     Problem: VTE (Actual)  Goal: Patient maintains mobility and remains free from complications of VTE  Outcome: Monitoring/Evaluating progress       OB Admission H&P: Postpartum Transfer    Assessment/Plan    Almaz Bates is a 34 y.o.  who delivered at 39w4d     Now PPD#1 s/p Vaginal, Spontaneous on 2025  - Continue routine postpartum care  - Pain well controlled on po medications  - DVT risk score DVT Score: 5 , ppx with SCDs, ambulation, and lovenox  - RH positive, rhogam not indicated  - Hgb:  12.3    Pregnancy notable for:  - Hypothyroidism on Synthroid daily  - IVF pregnancy  - History of GDM in prior pregnancy    Maternal Well-Being  - Vitals stable  - All questions and concerns address    [place] Feeding  - Breastfeeding/pumping encouraged  - Lactation consult prn    Contraception  - none  - Education provided    Dispo  - Anticipate d/c on PPD #2 if meeting all postpartum milestones  - Follow-up in 4-6wks with primary KATHI Saldivar MD MPH       Subjective   Almaz Bates is PPD#1 s/p vaginal delivery who reports feeling overall well.  No acute events during hte day yesterday or since arrival this morning.  Voiding spontaneously, passing flatus. Has not yet had a BM. Pain well controlled on PO meds. Light lochia. Tolerating diet.      Prenatal Provider: Josesito    OB History    Para Term  AB Living   2 2 2 0 0 2   SAB IAB Ectopic Multiple Live Births   0 0 0 0 2      # Outcome Date GA Lbr Clark/2nd Weight Sex Type Anes PTL Lv   2 Term 25 39w4d / 00:13 3.335 kg F Vag-Spont EPI  JOSE ALFREDO      Name: Hunter Bates      Apgar1: 8  Apgar5: 9   1 Term 22 39w0d  2.807 kg F Vag-Spont EPI N JOSE ALFREDO      Name: RADHA       Past Surgical History:   Procedure Laterality Date    EYE SURGERY      cyst removal       Social History     Tobacco Use    Smoking status: Never    Smokeless tobacco: Never   Substance Use Topics    Alcohol use: Not Currently       Allergies   Allergen Reactions    Amoxicillin Hives and Rash       Medications Prior to Admission   Medication Sig Dispense Refill Last Dose/Taking    levothyroxine  (Synthroid, Levoxyl) 25 mcg tablet Take 1 tablet (25 mcg) by mouth early in the morning.. Take on an empty stomach at the same time each day, either 30 to 60 minutes prior to breakfast 90 tablet 0 2/12/2025    prenatal no115/iron/folic acid (PRENATAL 19 ORAL) Take by mouth.   2/12/2025    cholecalciferol (Vitamin D-3) 25 MCG (1000 UT) tablet Take by mouth. (Patient not taking: Reported on 2/13/2025)   More than a month     Objective     Last Vitals  Temp Pulse Resp BP MAP O2 Sat   37.3 °C (99.1 °F) 67 18 124/86 99 96 %     Blood Pressures         2/13/2025  0235             BP: 124/86           Physical Exam:  General: examination reveals a well developed, well nourished, female, in no acute distress. She is alert and cooperative.  HEENT: external ears normal. Nose normal, no erythema or discharge.  Neck: supple, no significant adenopathy  Lungs: breathing even and unlabored, lungs clear  Cardiac: warm and well perfused, heart rate regular  Fundus: firm and below umbilicus, lochia light  Abdominal: soft, non-tender, non-distended, bowel sounds active  Extremities: no redness or tenderness in the calves or thighs, edema: +1 pitting edema bilaterally  Neurological: alert, oriented, normal speech, no focal findings or movement disorder noted.  Psychological: awake and alert; oriented to person, place, and time.  Skin: no rashes or lesions    Lab Data:  0   Lab Value Date/Time    GRPBSTREP No Group B Streptococcus (GBS) isolated 01/16/2025 0855     Lab Results   Component Value Date    WBC 13.5 (H) 02/12/2025    HGB 12.3 02/12/2025    HCT 34.9 (L) 02/12/2025     02/12/2025    TSH 0.56 12/20/2024    HGBA1C 5.0 02/23/2024

## 2025-02-14 VITALS
HEART RATE: 74 BPM | DIASTOLIC BLOOD PRESSURE: 82 MMHG | TEMPERATURE: 97 F | SYSTOLIC BLOOD PRESSURE: 115 MMHG | RESPIRATION RATE: 16 BRPM | OXYGEN SATURATION: 97 %

## 2025-02-14 PROCEDURE — G0378 HOSPITAL OBSERVATION PER HR: HCPCS

## 2025-02-14 PROCEDURE — 2500000001 HC RX 250 WO HCPCS SELF ADMINISTERED DRUGS (ALT 637 FOR MEDICARE OP)

## 2025-02-14 PROCEDURE — 2500000004 HC RX 250 GENERAL PHARMACY W/ HCPCS (ALT 636 FOR OP/ED)

## 2025-02-14 RX ORDER — DOCUSATE SODIUM 100 MG/1
100 CAPSULE, LIQUID FILLED ORAL 2 TIMES DAILY
Status: DISCONTINUED | OUTPATIENT
Start: 2025-02-14 | End: 2025-02-14 | Stop reason: HOSPADM

## 2025-02-14 RX ADMIN — IBUPROFEN 600 MG: 600 TABLET ORAL at 17:22

## 2025-02-14 RX ADMIN — DOCUSATE SODIUM 100 MG: 100 CAPSULE, LIQUID FILLED ORAL at 10:13

## 2025-02-14 RX ADMIN — POLYETHYLENE GLYCOL 3350 17 G: 17 POWDER, FOR SOLUTION ORAL at 06:11

## 2025-02-14 RX ADMIN — ACETAMINOPHEN 975 MG: 325 TABLET ORAL at 10:13

## 2025-02-14 RX ADMIN — IBUPROFEN 600 MG: 600 TABLET ORAL at 03:58

## 2025-02-14 RX ADMIN — ACETAMINOPHEN 975 MG: 325 TABLET ORAL at 03:57

## 2025-02-14 RX ADMIN — ACETAMINOPHEN 975 MG: 325 TABLET ORAL at 17:22

## 2025-02-14 RX ADMIN — LEVOTHYROXINE SODIUM 25 MCG: 25 TABLET ORAL at 06:08

## 2025-02-14 RX ADMIN — IBUPROFEN 600 MG: 600 TABLET ORAL at 10:13

## 2025-02-14 ASSESSMENT — PAIN DESCRIPTION - LOCATION
LOCATION: PERINEUM
LOCATION: PERINEUM

## 2025-02-14 ASSESSMENT — PAIN SCALES - GENERAL
PAINLEVEL_OUTOF10: 1
PAINLEVEL_OUTOF10: 2
PAINLEVEL_OUTOF10: 1
PAINLEVEL_OUTOF10: 2

## 2025-02-14 NOTE — CARE PLAN
The patient's goals for the shift include spend time with baby    The clinical goals for the shift include Meet PP milestones    VS and assessments stable. Pt discharged.

## 2025-02-14 NOTE — DISCHARGE SUMMARY
Discharge Summary    Almaz Bates is a 34 y.o. year old female , who delivered at 39w4d gestation via Vaginal, Spontaneous, now PPD#2.    Admission Date: 2025  Discharge Date: 25       Discharge Diagnosis  Vaginal, Spontaneous    Hospital Course  Delivery Date: 2025 5:44 AM  Delivery type: Vaginal, Spontaneous   GA at delivery: 39w4d   Outcome: Living  Intrapartum complications: None  Feeding method: Breastfeeding Status: Yes     Contraception at discharge: Declined. We discussed pregnancy spacing of at least one year, abstaining from intercourse for 6wks, and the ability to become pregnant in the absence of regular menses. Pt verbalized understanding.    Pertinent Physical Exam At Time of Discharge    Physical Exam  Vitals reviewed.   Constitutional:       Appearance: Normal appearance.   HENT:      Head: Normocephalic.   Cardiovascular:      Rate and Rhythm: Normal rate and regular rhythm.      Pulses: Normal pulses.      Heart sounds: Normal heart sounds.   Pulmonary:      Effort: Pulmonary effort is normal.      Breath sounds: Normal breath sounds.   Abdominal:      General: Abdomen is flat. Bowel sounds are normal.      Palpations: Abdomen is soft.      Comments: Fundus firm, midline, below umbilicus, light lochia      Skin:     General: Skin is warm.   Neurological:      Mental Status: She is alert.   Psychiatric:         Mood and Affect: Mood normal.         Behavior: Behavior normal.          Vitals  /82 (BP Location: Left arm, Patient Position: Lying)   Pulse 74   Temp 36.1 °C (97 °F) (Temporal)   Resp 16   LMP 2024 (Exact Date)   SpO2 97%   Breastfeeding Yes      Discharge Meds     Your medication list        START taking these medications        Instructions Last Dose Given Next Dose Due   acetaminophen 325 mg tablet  Commonly known as: Tylenol      Take 3 tablets (975 mg) by mouth every 6 hours.       ibuprofen 600 mg tablet      Take 1 tablet (600 mg) by mouth  every 6 hours.              CONTINUE taking these medications        Instructions Last Dose Given Next Dose Due   levothyroxine 25 mcg tablet  Commonly known as: Synthroid, Levoxyl      Take 1 tablet (25 mcg) by mouth early in the morning.. Take on an empty stomach at the same time each day, either 30 to 60 minutes prior to breakfast              STOP taking these medications      cholecalciferol 25 MCG (1000 UT) tablet  Commonly known as: Vitamin D-3        PRENATAL 19 ORAL                  Where to Get Your Medications        These medications were sent to [facility]/pharmacy #[id number] - [address] AT [place] [address]      Phone: [phone]   acetaminophen 325 mg tablet  ibuprofen 600 mg tablet          Complications Requiring Follow-Up  Hypothyroidism  - Continue synthroid 25mcg  - Follows w/ PCP    Continues to meet all postpartum milestones.  Safe and stable for d/c home. Return precautions given.  Follow-up in 4-6wk with Dr. Bellamy.      Test Results Pending At Discharge  Pending Labs       No current pending labs.            Outpatient Follow-Up    I spent 20 minutes in the professional and overall care of this patient.      Melany Weir, APRN-CNP

## 2025-02-14 NOTE — CARE PLAN
The patient's goals for the shift include spend time with baby    The clinical goals for the shift include stable vital signs

## 2025-02-18 ASSESSMENT — PAIN SCALES - GENERAL: PAIN_LEVEL: 1

## 2025-02-18 NOTE — ANESTHESIA POSTPROCEDURE EVALUATION
Patient: Almaz Bates    Procedure Summary       Date: 02/12/25 Room / Location:     Anesthesia Start: 0040 Anesthesia Stop: 0544    Procedure: Labor Analgesia Diagnosis:     Scheduled Providers:  Responsible Provider: TONIA Whitaker    Anesthesia Type: epidural ASA Status: 2            Anesthesia Type: epidural    Vitals Value Taken Time   /72 02/18/25 0825   Temp 36.7 02/18/25 0825   Pulse 106 02/18/25 0825   Resp 20 02/18/25 0825   SpO2 99 02/18/25 0825         Anesthesia Post Evaluation    Patient location during evaluation: bedside  Patient participation: complete - patient participated  Level of consciousness: awake  Pain score: 1  Pain management: adequate  Airway patency: patent  Cardiovascular status: acceptable  Respiratory status: acceptable  Hydration status: acceptable  Postoperative Nausea and Vomiting: none        No notable events documented.

## 2025-02-19 ENCOUNTER — TELEPHONE (OUTPATIENT)
Dept: PRIMARY CARE | Facility: CLINIC | Age: 35
End: 2025-02-19
Payer: COMMERCIAL

## 2025-02-23 DIAGNOSIS — E03.9 HYPOTHYROIDISM, UNSPECIFIED TYPE: ICD-10-CM

## 2025-02-23 DIAGNOSIS — Z00.00 ANNUAL PHYSICAL EXAM: Primary | ICD-10-CM

## 2025-02-27 LAB
ALT SERPL-CCNC: 19 U/L (ref 6–29)
ANION GAP SERPL CALCULATED.4IONS-SCNC: 9 MMOL/L (CALC) (ref 7–17)
AST SERPL-CCNC: 16 U/L (ref 10–30)
BUN SERPL-MCNC: 15 MG/DL (ref 7–25)
BUN/CREAT SERPL: NORMAL (CALC) (ref 6–22)
CALCIUM SERPL-MCNC: 9.1 MG/DL (ref 8.6–10.2)
CHLORIDE SERPL-SCNC: 104 MMOL/L (ref 98–110)
CHOLEST SERPL-MCNC: 255 MG/DL
CHOLEST/HDLC SERPL: 4.1 (CALC)
CO2 SERPL-SCNC: 28 MMOL/L (ref 20–32)
CREAT SERPL-MCNC: 0.9 MG/DL (ref 0.5–0.97)
EGFRCR SERPLBLD CKD-EPI 2021: 86 ML/MIN/1.73M2
ERYTHROCYTE [DISTWIDTH] IN BLOOD BY AUTOMATED COUNT: 12.5 % (ref 11–15)
GLUCOSE SERPL-MCNC: 86 MG/DL (ref 65–99)
HCT VFR BLD AUTO: 43.2 % (ref 35–45)
HDLC SERPL-MCNC: 62 MG/DL
HGB BLD-MCNC: 14.2 G/DL (ref 11.7–15.5)
LDLC SERPL CALC-MCNC: 167 MG/DL (CALC)
MCH RBC QN AUTO: 31.8 PG (ref 27–33)
MCHC RBC AUTO-ENTMCNC: 32.9 G/DL (ref 32–36)
MCV RBC AUTO: 96.9 FL (ref 80–100)
NONHDLC SERPL-MCNC: 193 MG/DL (CALC)
PLATELET # BLD AUTO: 298 THOUSAND/UL (ref 140–400)
PMV BLD REES-ECKER: 11.4 FL (ref 7.5–12.5)
POTASSIUM SERPL-SCNC: 4.6 MMOL/L (ref 3.5–5.3)
RBC # BLD AUTO: 4.46 MILLION/UL (ref 3.8–5.1)
SODIUM SERPL-SCNC: 141 MMOL/L (ref 135–146)
TRIGL SERPL-MCNC: 124 MG/DL
TSH SERPL-ACNC: 0.88 MIU/L
WBC # BLD AUTO: 7.7 THOUSAND/UL (ref 3.8–10.8)

## 2025-03-04 ENCOUNTER — APPOINTMENT (OUTPATIENT)
Dept: PRIMARY CARE | Facility: CLINIC | Age: 35
End: 2025-03-04
Payer: COMMERCIAL

## 2025-03-04 VITALS
BODY MASS INDEX: 31.53 KG/M2 | HEIGHT: 61 IN | OXYGEN SATURATION: 97 % | HEART RATE: 79 BPM | DIASTOLIC BLOOD PRESSURE: 74 MMHG | SYSTOLIC BLOOD PRESSURE: 106 MMHG | WEIGHT: 167 LBS

## 2025-03-04 DIAGNOSIS — E78.5 HYPERLIPIDEMIA, UNSPECIFIED HYPERLIPIDEMIA TYPE: ICD-10-CM

## 2025-03-04 DIAGNOSIS — E03.9 HYPOTHYROIDISM, UNSPECIFIED TYPE: ICD-10-CM

## 2025-03-04 DIAGNOSIS — E66.09 CLASS 1 OBESITY DUE TO EXCESS CALORIES WITHOUT SERIOUS COMORBIDITY WITH BODY MASS INDEX (BMI) OF 31.0 TO 31.9 IN ADULT: ICD-10-CM

## 2025-03-04 DIAGNOSIS — E66.811 CLASS 1 OBESITY DUE TO EXCESS CALORIES WITHOUT SERIOUS COMORBIDITY WITH BODY MASS INDEX (BMI) OF 31.0 TO 31.9 IN ADULT: ICD-10-CM

## 2025-03-04 DIAGNOSIS — Z00.00 ANNUAL PHYSICAL EXAM: Primary | ICD-10-CM

## 2025-03-04 PROBLEM — L98.9 INFLAMMATORY DERMATOSIS: Status: RESOLVED | Noted: 2024-01-31 | Resolved: 2025-03-04

## 2025-03-04 PROBLEM — Z31.83 IN VITRO FERTILIZATION: Status: RESOLVED | Noted: 2024-07-22 | Resolved: 2025-03-04

## 2025-03-04 PROBLEM — R10.13 EPIGASTRIC PAIN: Status: RESOLVED | Noted: 2024-01-31 | Resolved: 2025-03-04

## 2025-03-04 PROBLEM — Z37.9 NORMAL LABOR (HHS-HCC): Status: RESOLVED | Noted: 2025-02-11 | Resolved: 2025-03-04

## 2025-03-04 PROCEDURE — 1036F TOBACCO NON-USER: CPT | Performed by: FAMILY MEDICINE

## 2025-03-04 PROCEDURE — 99395 PREV VISIT EST AGE 18-39: CPT | Performed by: FAMILY MEDICINE

## 2025-03-04 PROCEDURE — 3008F BODY MASS INDEX DOCD: CPT | Performed by: FAMILY MEDICINE

## 2025-03-04 RX ORDER — LEVOTHYROXINE SODIUM 25 UG/1
25 TABLET ORAL DAILY
Qty: 90 TABLET | Refills: 1 | Status: SHIPPED | OUTPATIENT
Start: 2025-03-04

## 2025-03-04 ASSESSMENT — PATIENT HEALTH QUESTIONNAIRE - PHQ9
2. FEELING DOWN, DEPRESSED OR HOPELESS: NOT AT ALL
1. LITTLE INTEREST OR PLEASURE IN DOING THINGS: NOT AT ALL
SUM OF ALL RESPONSES TO PHQ9 QUESTIONS 1 AND 2: 0

## 2025-03-04 ASSESSMENT — ENCOUNTER SYMPTOMS
LOSS OF SENSATION IN FEET: 0
OCCASIONAL FEELINGS OF UNSTEADINESS: 0
DEPRESSION: 0

## 2025-03-04 NOTE — PATIENT INSTRUCTIONS
Reviewed lab results.  Refilled medication.  Recommend lipid lowering diet (see [email or web address] for ideas).   Recommend weight loss efforts (see [email or web address] for ideas).     F/U 6 months (fasting labs prior to visit): Med refills.    Lab services: [address]  Hours: M-F 7:15a-6:00p  Phone: [phone], Option 1

## 2025-03-04 NOTE — PROGRESS NOTES
"Subjective   Patient ID: Almaz Bates is a 34 y.o. female who presents for Annual Exam.    HPI   Patient's health is described as good.  Regular dental visits: Yes.  Dental hygiene (brushing/flossing) regularly performed: Yes.  Corrective lenses: No.  Vision problems: No.  Last eye exam within 1 year: No.  Hearing loss: No.  Requests audiology referral: No.  Immunizations up to date: Yes.  Healthy diet: Yes.  Regular exercise: No.  Trying to lose weight: Yes.  Requests nutrition/weight loss referral: No.  Sexually active: Yes.  Using contraception: No.  Requests STD screening: No.  Colon cancer screening up to date: N/A.  Lung cancer screening up to date: N/A.  Hepatitis C screening up to date: Yes.    PAPs managed by GYN.  Mammogram up to date: N/A.    Has Hypothyroidism.  Condition(s) stable.  Taking med(s) as directed.  Requests refills.    Labs obtained prior to visit.     Reviewed/Updated Active problem list, PMH, PSH, FH, SH, Meds, Allergies.    Review of Systems  No other complaints.     Objective   /74   Pulse 79   Ht 1.549 m (5' 1\")   Wt 75.8 kg (167 lb)   LMP 05/11/2024 (Exact Date)   SpO2 97%   BMI 31.55 kg/m²     Physical Exam  Constitutional:       General: She is not in acute distress.     Appearance: She is obese.   HENT:      Head: Normocephalic.      Right Ear: Tympanic membrane normal.      Left Ear: Tympanic membrane normal.      Mouth/Throat:      Pharynx: Oropharynx is clear. No oropharyngeal exudate or posterior oropharyngeal erythema.   Eyes:      Extraocular Movements: Extraocular movements intact.      Conjunctiva/sclera: Conjunctivae normal.      Pupils: Pupils are equal, round, and reactive to light.   Neck:      Thyroid: No thyromegaly.      Vascular: No carotid bruit.   Cardiovascular:      Rate and Rhythm: Normal rate and regular rhythm.      Heart sounds: Normal heart sounds. No murmur heard.     No friction rub. No gallop.   Pulmonary:      Effort: Pulmonary effort is " normal.      Breath sounds: Normal breath sounds. No wheezing, rhonchi or rales.   Abdominal:      General: Bowel sounds are normal. There is no distension.      Palpations: Abdomen is soft. There is no mass.      Tenderness: There is no abdominal tenderness. There is no guarding or rebound.   Lymphadenopathy:      Cervical: No cervical adenopathy.   Skin:     Coloration: Skin is not jaundiced or pale.   Neurological:      General: No focal deficit present.      Mental Status: She is oriented to person, place, and time.   Psychiatric:         Mood and Affect: Mood normal.         Behavior: Behavior normal.     Assessment/Plan   Diagnoses and all orders for this visit:  Annual physical exam  Hypothyroidism, unspecified type  -     levothyroxine (Synthroid, Levoxyl) 25 mcg tablet; Take 1 tablet (25 mcg) by mouth early in the morning.. Take on an empty stomach at the same time each day, either 30 to 60 minutes prior to breakfast  -     Thyroid Stimulating Hormone; Future  Hyperlipidemia, unspecified hyperlipidemia type  -     Lipid Panel; Future  Class 1 obesity due to excess calories without serious comorbidity with body mass index (BMI) of 31.0 to 31.9 in adult    Reviewed lab results.  Refilled medication.  Recommend lipid lowering diet (see [email or web address] for ideas).   Recommend weight loss efforts (see [email or web address] for ideas).     F/U 6 months (fasting labs prior to visit): Med refills.

## 2025-03-31 ENCOUNTER — APPOINTMENT (OUTPATIENT)
Dept: OBSTETRICS AND GYNECOLOGY | Facility: CLINIC | Age: 35
End: 2025-03-31
Payer: COMMERCIAL

## 2025-03-31 VITALS
BODY MASS INDEX: 31.91 KG/M2 | DIASTOLIC BLOOD PRESSURE: 74 MMHG | WEIGHT: 169 LBS | HEIGHT: 61 IN | SYSTOLIC BLOOD PRESSURE: 114 MMHG

## 2025-03-31 PROCEDURE — 0503F POSTPARTUM CARE VISIT: CPT | Performed by: OBSTETRICS & GYNECOLOGY

## 2025-03-31 ASSESSMENT — PAIN SCALES - GENERAL: PAINLEVEL_OUTOF10: 0-NO PAIN

## 2025-03-31 ASSESSMENT — EDINBURGH POSTNATAL DEPRESSION SCALE (EPDS)
I HAVE BEEN SO UNHAPPY THAT I HAVE BEEN CRYING: NO, NEVER
I HAVE FELT SCARED OR PANICKY FOR NO GOOD REASON: NO, NOT AT ALL
THINGS HAVE BEEN GETTING ON TOP OF ME: NO, I HAVE BEEN COPING AS WELL AS EVER
I HAVE BEEN ANXIOUS OR WORRIED FOR NO GOOD REASON: NO, NOT AT ALL
I HAVE BLAMED MYSELF UNNECESSARILY WHEN THINGS WENT WRONG: NO, NEVER
I HAVE FELT SAD OR MISERABLE: NO, NOT AT ALL
THE THOUGHT OF HARMING MYSELF HAS OCCURRED TO ME: NEVER
I HAVE BEEN ABLE TO LAUGH AND SEE THE FUNNY SIDE OF THINGS: AS MUCH AS I ALWAYS COULD
I HAVE LOOKED FORWARD WITH ENJOYMENT TO THINGS: AS MUCH AS I EVER DID
I HAVE BEEN SO UNHAPPY THAT I HAVE HAD DIFFICULTY SLEEPING: NOT AT ALL
TOTAL SCORE: 0

## 2025-03-31 NOTE — PROGRESS NOTES
Subjective   Patient ID [id number]      Almaz Bates is a 34 y.o.  who delivered at 39w4d by Vaginal, Spontaneous. She is here for her 6 week postpartum visit.   Her delivery was complicated by None [0] and  . Her postpartum course has been uncomplicated. She denies vaginal bleeding, and her bowel and bladder functioning is normal. She denies postpartum depression or blues with [place]  Depression Scale Total: 0. Baby is doing well and gaining weight appropriately. The patient is currently breastfeeding..    Objective   Weight: 76.7 kg (169 lb)  BP: 114/74  EGBUS normal  Vagina laceration repair healed   Cervix no lesions   Uterus adnexa no mass nontender    Assessment/Plan     Options for postpartum family planning were discussed. These included combination oral contraceptive options, progesterone only pills, implantable contraception, intrauterine devices, barrier methods, sterilization, and the rhythm method. The patient's questions were answered. She decided on NFP as her preferred method of contraception.   Follow up: 3 mo, for annual well-woman care.

## 2025-07-21 ENCOUNTER — APPOINTMENT (OUTPATIENT)
Dept: OBSTETRICS AND GYNECOLOGY | Facility: CLINIC | Age: 35
End: 2025-07-21
Payer: COMMERCIAL

## 2025-08-07 ENCOUNTER — APPOINTMENT (OUTPATIENT)
Dept: OBSTETRICS AND GYNECOLOGY | Facility: CLINIC | Age: 35
End: 2025-08-07
Payer: COMMERCIAL

## 2025-08-07 VITALS
BODY MASS INDEX: 30.78 KG/M2 | SYSTOLIC BLOOD PRESSURE: 110 MMHG | DIASTOLIC BLOOD PRESSURE: 70 MMHG | WEIGHT: 163 LBS | HEIGHT: 61 IN

## 2025-08-07 DIAGNOSIS — Z01.419 WELL WOMAN EXAM: ICD-10-CM

## 2025-08-07 PROCEDURE — 1036F TOBACCO NON-USER: CPT | Performed by: OBSTETRICS & GYNECOLOGY

## 2025-08-07 PROCEDURE — 99395 PREV VISIT EST AGE 18-39: CPT | Performed by: OBSTETRICS & GYNECOLOGY

## 2025-08-07 PROCEDURE — 3008F BODY MASS INDEX DOCD: CPT | Performed by: OBSTETRICS & GYNECOLOGY

## 2025-08-07 ASSESSMENT — PAIN SCALES - GENERAL: PAINLEVEL_OUTOF10: 0-NO PAIN

## 2025-08-07 NOTE — PROGRESS NOTES
"Almaz Bates is a 35 y.o. female who is here for a routine exam. PCP = Giorgi Mackenzie MD  Stays at home with kids    Menses : breastfeeding  Contraception : other  HPV vaccine : No  Last pap : 2024 normal  Last HPV : 2024 negative  History of abnormal pap : No  Last mammogram : Never  History of abnormal mammogram : No  Colon cancer screen : Never    ROS  systems reviewed, anything negative noted in HPI    bladder: no dysuria, gross hematuria, urinary frequency, urgency or incontinence  breast: no lumps, nipple d/c, overlying skin changes, redness, or skin retraction    [unfilled]    Past med hx and past surg hx reviewed and notable for:       Objective   /70   Ht (!) 1.549 m (5' 1\")   Wt 73.9 kg (163 lb)   LMP 08/03/2025   Breastfeeding Yes   BMI 30.80 kg/m²      General:   Alert and oriented, in no acute distress   Neck: Supple. No visible thyromegaly.    Breast/Axilla: Normal to palpation bilaterally without masses, skin changes, lymphadenopathy, or nipple discharge.    Abdomen: Soft, non-tender, without masses or organomegaly   Vulva:  Urethra: Normal architecture without erythema, masses, or lesions.   Normal    Vagina: Normal mucosa without lesions, masses, or atrophy. No abnormal vaginal discharge.    Cervix: Normal without masses, lesions, or signs of cervicitis.    Uterus: Normal mobile, non-enlarged uterus    Adnexa: Normal without masses or lesions   Pelvic Floor: No POP noted.    Psych:  Anus/Perineum: Normal affect. Normal mood.   Normal without masses or lesions      Thank you for coming to your annual exam. Your findings during the exam were normal. Specific topics addressed during this exam included: healthy lifestyle, well woman screening guidelines,     Actions performed during this visit include:  - Clinical breast exam  - Clinical pelvic exam  - pap  No orders of the defined types were placed in this encounter.          Please return for your next visit in 1 year.  "

## 2025-08-21 DIAGNOSIS — E03.9 HYPOTHYROIDISM, UNSPECIFIED TYPE: ICD-10-CM

## 2025-08-21 DIAGNOSIS — E78.5 HYPERLIPIDEMIA, UNSPECIFIED HYPERLIPIDEMIA TYPE: ICD-10-CM

## 2025-08-26 LAB
CHOLEST SERPL-MCNC: 179 MG/DL
CHOLEST/HDLC SERPL: 3.4 (CALC)
HDLC SERPL-MCNC: 53 MG/DL
LDLC SERPL CALC-MCNC: 110 MG/DL (CALC)
NONHDLC SERPL-MCNC: 126 MG/DL (CALC)
TRIGL SERPL-MCNC: 75 MG/DL
TSH SERPL-ACNC: 1.83 MIU/L

## 2025-09-02 ENCOUNTER — APPOINTMENT (OUTPATIENT)
Dept: PRIMARY CARE | Facility: CLINIC | Age: 35
End: 2025-09-02
Payer: COMMERCIAL

## 2025-09-02 LAB
CYTOLOGY CMNT CVX/VAG CYTO-IMP: NORMAL
LAB AP HPV GENOTYPE QUESTION: YES
LAB AP HPV HR: NORMAL
LABORATORY COMMENT REPORT: NORMAL
LMP START DATE: NORMAL
PATH REPORT.TOTAL CANCER: NORMAL

## 2025-09-03 DIAGNOSIS — Z00.00 ANNUAL PHYSICAL EXAM: Primary | ICD-10-CM

## 2025-09-03 DIAGNOSIS — E78.5 HYPERLIPIDEMIA, UNSPECIFIED HYPERLIPIDEMIA TYPE: ICD-10-CM

## 2025-09-03 DIAGNOSIS — E03.9 HYPOTHYROIDISM, UNSPECIFIED TYPE: ICD-10-CM

## 2026-03-02 ENCOUNTER — APPOINTMENT (OUTPATIENT)
Dept: PRIMARY CARE | Facility: CLINIC | Age: 36
End: 2026-03-02
Payer: COMMERCIAL